# Patient Record
Sex: FEMALE | Race: WHITE | URBAN - METROPOLITAN AREA
[De-identification: names, ages, dates, MRNs, and addresses within clinical notes are randomized per-mention and may not be internally consistent; named-entity substitution may affect disease eponyms.]

---

## 2024-10-16 ENCOUNTER — TELEPHONE (OUTPATIENT)
Dept: SURGERY | Facility: CLINIC | Age: 54
End: 2024-10-16
Payer: COMMERCIAL

## 2024-11-11 ENCOUNTER — OFFICE VISIT (OUTPATIENT)
Dept: SURGERY | Facility: CLINIC | Age: 54
End: 2024-11-11
Payer: COMMERCIAL

## 2024-11-11 VITALS
HEART RATE: 73 BPM | WEIGHT: 156.63 LBS | SYSTOLIC BLOOD PRESSURE: 142 MMHG | OXYGEN SATURATION: 99 % | DIASTOLIC BLOOD PRESSURE: 89 MMHG

## 2024-11-11 DIAGNOSIS — C18.9 ADENOCARCINOMA OF COLON: Primary | ICD-10-CM

## 2024-11-11 PROCEDURE — 4010F ACE/ARB THERAPY RXD/TAKEN: CPT | Mod: CPTII,S$GLB,, | Performed by: STUDENT IN AN ORGANIZED HEALTH CARE EDUCATION/TRAINING PROGRAM

## 2024-11-11 PROCEDURE — 3077F SYST BP >= 140 MM HG: CPT | Mod: CPTII,S$GLB,, | Performed by: STUDENT IN AN ORGANIZED HEALTH CARE EDUCATION/TRAINING PROGRAM

## 2024-11-11 PROCEDURE — 1159F MED LIST DOCD IN RCRD: CPT | Mod: CPTII,S$GLB,, | Performed by: STUDENT IN AN ORGANIZED HEALTH CARE EDUCATION/TRAINING PROGRAM

## 2024-11-11 PROCEDURE — 1160F RVW MEDS BY RX/DR IN RCRD: CPT | Mod: CPTII,S$GLB,, | Performed by: STUDENT IN AN ORGANIZED HEALTH CARE EDUCATION/TRAINING PROGRAM

## 2024-11-11 PROCEDURE — 99205 OFFICE O/P NEW HI 60 MIN: CPT | Mod: S$GLB,,, | Performed by: STUDENT IN AN ORGANIZED HEALTH CARE EDUCATION/TRAINING PROGRAM

## 2024-11-11 PROCEDURE — G2211 COMPLEX E/M VISIT ADD ON: HCPCS | Mod: S$GLB,,, | Performed by: STUDENT IN AN ORGANIZED HEALTH CARE EDUCATION/TRAINING PROGRAM

## 2024-11-11 PROCEDURE — 3079F DIAST BP 80-89 MM HG: CPT | Mod: CPTII,S$GLB,, | Performed by: STUDENT IN AN ORGANIZED HEALTH CARE EDUCATION/TRAINING PROGRAM

## 2024-11-11 PROCEDURE — 99999 PR PBB SHADOW E&M-EST. PATIENT-LVL III: CPT | Mod: PBBFAC,,, | Performed by: STUDENT IN AN ORGANIZED HEALTH CARE EDUCATION/TRAINING PROGRAM

## 2024-11-11 RX ORDER — LISINOPRIL 10 MG/1
10 TABLET ORAL EVERY MORNING
COMMUNITY

## 2024-11-11 NOTE — PROGRESS NOTES
Surgical Oncology Clinic Note      Referring Provider: Dr. Gucci Vargas   PCP: No primary care provider on file.    Reason For Visit: No chief complaint on file.      Oncologic History:   - Sigmoid adenocarcinoma s/p robo sigmoidectomy     History of Present Illness:  Ms Leon is a 53yo female referred to the surgery clinic for oligometastatic colon cancer with 2 discrete liver mets. The patient was initially seen in November 2023 with new onset hematochezia. As part of her workup, she was found to have elevated LFTs for which a RUQ U/S was performed and demonstrated a large ~6.5cm anterior liver lesion concerning for possible met which was further characterized on CT and later biopsied with path demonstrating adenocarcinoma. She further underwent a colonoscopy demonstrating an apple core lesion in her sigmoid colon with path consistent with a adenocarcinoma. The patient was seen by colorectal surgery who took her for a robotic assisted sigmoid colectomy and port placement in January with chemotherapy initiation shortly after. She has been followed in the hemo/onc clinic for ongoing chemotherapy (now completed 20 cycles) and interval assessment of her metastatic lesions on serial CT scans. Her liver lesions are treatment responsive and have shown an interval decrease in size. She has now been referred to the surg onc clinic for discussion of hepatectomy.     The patient is largely asymptomatic at this time. She has fully healed from her sigmoidectomy and is tolerating a diet, having regular bowel movements and ambulating without difficulty. Since adjustment of her chemo regimen, she notes significant improvement in the side effects.      Pathology:    Staging: Cancer Staging   No matching staging information was found for the patient.   Staging form: Colon and Rectum, AJCC 8th Edition  - Pathologic stage from 1/10/2024: Stage MAXINE (pT3, pN1a, pM1a) - Signed by Skip Mcnally MD on 1/11/2024     Active  Treatment:  [No matching plan found]  5-FU/LV plux oxaliplatin plus Vectibix (currently completed 20 cycles)     Current Outpatient Medications:     lisinopriL 10 MG tablet, Take 10 mg by mouth every morning., Disp: , Rfl:     Review of patient's allergies indicates:  No Known Allergies    No past medical history on file.    No past surgical history on file.    No family history on file.    Social History     Socioeconomic History    Marital status: Unknown   Tobacco Use    Smoking status: Never     Passive exposure: Never    Smokeless tobacco: Never     Social Drivers of Health     Financial Resource Strain: High Risk (5/29/2023)    Received from [facility]    Overall Financial Resource Strain (CARDIA)     Difficulty of Paying Living Expenses: Hard   Food Insecurity: No Food Insecurity (1/9/2024)    Received from [facility]    Hunger Vital Sign     Worried About Running Out of Food in the Last Year: Never true     Ran Out of Food in the Last Year: Never true   Transportation Needs: No Transportation Needs (1/9/2024)    Received from [facility]    PRAPARE - Transportation     Lack of Transportation (Medical): No     Lack of Transportation (Non-Medical): No   Physical Activity: Insufficiently Active (5/29/2023)    Received from [facility]    Exercise Vital Sign     Days of Exercise per Week: 2 days     Minutes of Exercise per Session: 60 min   Stress: Stress Concern Present (5/29/2023)    Received from [facility]    [employer] - Occupational Stress Questionnaire     Feeling of Stress : Very much   Housing Stability: Low Risk  (1/9/2024)    Received from [facility]    Housing Stability Vital Sign     Unable to Pay for Housing in the Last Year: No     Number of Places Lived in the Last Year: 1     Unstable Housing in the Last Year: No       ROS    Vitals:    11/11/24 0922   BP: (!) 142/89    Pulse: 73     There is no height or weight on file to calculate BMI.  Physical Exam  Constitutional:       Appearance: She is obese.   HENT:      Head: Normocephalic and atraumatic.   Eyes:      Extraocular Movements: Extraocular movements intact.      Conjunctiva/sclera: Conjunctivae normal.      Pupils: Pupils are equal, round, and reactive to light.   Cardiovascular:      Rate and Rhythm: Normal rate.   Pulmonary:      Effort: Pulmonary effort is normal.      Breath sounds: Normal breath sounds.   Abdominal:      General: Abdomen is flat.      Palpations: Abdomen is soft.      Comments: No palpable masses. Well healed lap scars    Musculoskeletal:         General: Normal range of motion.      Cervical back: Normal range of motion.   Skin:     General: Skin is warm and dry.   Neurological:      General: No focal deficit present.      Mental Status: She is oriented to person, place, and time. Mental status is at baseline.   Psychiatric:         Mood and Affect: Mood normal.        DATA REVIEW:  I personally reviewed the following records for this visit: lab work from prior visit, notes from other physicians, surgical pathology results, endoscopy results, radiographic study evaluation, and laboratory results done by primary care physician    Lab Results   Component Value Date    WBC 5.1 11/05/2024    HGB 11.5 11/05/2024    HCT 34.6 11/05/2024     11/05/2024    CHOL 206 (H) 05/30/2023    TRIG 99 05/30/2023    HDL 74 (H) 05/30/2023    LDLCALC 113 (H) 05/30/2023    TSH 2.46 05/30/2023       Lab Results   Component Value Date    CEA 2.15 11/05/2024        Radiology:     EXAMINATION:   CT CHEST/THORAX WITH CONTRAST; CT ABD & PELVIS WITH CONTRAST,   11/4/2024     CLINICAL HISTORY:   55 y/o F,.    Cancer of sigmoid colon.   Colon cancer.  Subsequent   treatment planning.     TECHNIQUE:   5 mm axial imaging of the chest, abdomen and pelvis was performed with   sagittal and coronal reconstruction.     CONTRAST:    IV: 80mL Omnipaque 350 IV contrast.   Oral:  Oral contrast was administered..     COMPARISON:   CT of the chest, abdomen, and pelvis from August 6, 2024.     FINDINGS:   Chest: The lungs are clear.  There is no suspicious pulmonary nodule,   mass, consolidation, pleural effusion, or pneumothorax.  The central   airways and major segmental bronchi are widely patent.     The heart is normal in size.  No pericardial effusion.  Thoracic aorta   is nonaneurysmal.  There is an aberrant right subclavian artery   calcified right hilar node is unchanged and likely related to old   granulomatous disease.  There is no new adenopathy.  Right IJ port   terminates in the SVC.     Upper Abdomen: The hyperattenuating metastatic lesions in the liver   continue to decrease in size.  For reference, the largest lesion in the   lateral hepatic segment now measures 4.4 x 3 cm (previously 5.6 x 3.6   cm).  The lesion in the anterior hepatic segment now measures 2.8 x 2   cm (previously 3.4 x 2.3 cm).  There is no new liver lesion.  The   spleen, adrenal glands, gallbladder, pancreas, and biliary tree are   within normal limits.     : No hydronephrosis or suspicious mass.  Urinary bladder is distended   and otherwise unremarkable.  Uterus is present.  Calcified fibroid is   noted.     GI: The stomach is decompressed and otherwise normal.  Small bowel is   normal in caliber.  No evidence of small-bowel obstruction or   inflammation.  Oral contrast reaches the distal small bowel.  There are   no secondary findings of acute appendicitis in the right lower   quadrant.  Stable postoperative changes in the sigmoid colon.     Peritoneum, lymph nodes, vasculature: No free fluid, free air or bulky   adenopathy.  The abdominal aorta is nonaneurysmal.  Anterior abdominal   wall is unremarkable.     ASSESSMENT & PLAN:  No diagnosis found. Metastatic colon cancer   Oumou Leon is a 54 y.o. 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 She has completed 20 cycles of chemotherapy already and has been referred for discussion of hepatectomy. Pt liver function panel iss largely within normal limits but would recommend cessation of chemotherapy prior to ongoing discussion of hepatectomy. Additionally, on her initial MRI last year, she had what appeared to be 2 additional liver lesions that are no longer appreciated on CT. Will need a repeat MRI to evaluate. Size of lesions currently are amenable to resection but will need to rule out additional lesions first.   -- repeat MRI liver protocol   -- recommend stopping chemotherapy   -- will need to see pt back in surg onc clinic following repeat MRI       [place] MDR4

## 2024-11-12 ENCOUNTER — TELEPHONE (OUTPATIENT)
Dept: SURGERY | Facility: CLINIC | Age: 54
End: 2024-11-12
Payer: COMMERCIAL

## 2024-11-12 PROBLEM — C18.9 ADENOCARCINOMA OF COLON: Status: ACTIVE | Noted: 2024-11-12

## 2024-11-12 NOTE — TELEPHONE ENCOUNTER
Returned pt call. Pt states Dr Vargas had  order pt MRI and is vitaliy for 11/19/24.  Informed her our office will need to get [facility] to send us the images. Pt states she will ask Dr Vargas office to do that.

## 2024-11-12 NOTE — TELEPHONE ENCOUNTER
----- Message from Emma sent at 11/12/2024 10:08 AM CST -----  Regarding: PT ADVICE  Contact: [phone]  Pt iS requesting a  call from someone in the office and is asking for a return call back soon. Thanks.     Reason for call:DISCUSS PLAN of care      Patient's DX:    [phone]  Patient requesting call back or [facility] msg: PT@ [phone]

## 2024-11-14 ENCOUNTER — DOCUMENTATION ONLY (OUTPATIENT)
Dept: HEMATOLOGY/ONCOLOGY | Facility: CLINIC | Age: 54
End: 2024-11-14
Payer: COMMERCIAL

## 2024-11-14 NOTE — PROGRESS NOTES
[facility]     Colorectal Liver Metastasis Tumor Board Note      Date: 11/14/2024    Referring Provider: Dr. Rosado     Case Overview: Mrs. Leon (54F) was initially diagnosed in 12/2023 with sigmoid colon adenocarcinoma with synchronous liver metastases. Underwent colectomy in 1/2024 followed by 20 cycles of FOLFOX + vectibix.     Participants: medical oncology, surgical oncology, colorectal surgery, transplant surgeons, interventional radiology, and oncology navigator     Imaging Reviewed: CT 11/2024    Radiology Review: Interval decrease in metastatic disease to the liver, the largest lesion now measures 4.4 cm, previously  5.6 cm. No new suspicious liver lesions.     Orders/Referrals: none.     Board Recommendations: Proceed with resection as planned. If CESIA post-op, recommend proceeding with surveillance.

## 2024-11-25 ENCOUNTER — LAB VISIT (OUTPATIENT)
Dept: LAB | Facility: HOSPITAL | Age: 54
End: 2024-11-25
Attending: STUDENT IN AN ORGANIZED HEALTH CARE EDUCATION/TRAINING PROGRAM
Payer: COMMERCIAL

## 2024-11-25 ENCOUNTER — OFFICE VISIT (OUTPATIENT)
Dept: SURGERY | Facility: CLINIC | Age: 54
End: 2024-11-25
Payer: COMMERCIAL

## 2024-11-25 ENCOUNTER — DOCUMENTATION ONLY (OUTPATIENT)
Dept: SURGERY | Facility: CLINIC | Age: 54
End: 2024-11-25

## 2024-11-25 VITALS
WEIGHT: 159.5 LBS | HEART RATE: 61 BPM | OXYGEN SATURATION: 100 % | SYSTOLIC BLOOD PRESSURE: 133 MMHG | DIASTOLIC BLOOD PRESSURE: 80 MMHG

## 2024-11-25 DIAGNOSIS — C18.9 ADENOCARCINOMA OF COLON: Primary | ICD-10-CM

## 2024-11-25 DIAGNOSIS — C18.9 ADENOCARCINOMA OF COLON: ICD-10-CM

## 2024-11-25 LAB
ALBUMIN SERPL BCP-MCNC: 4.1 G/DL (ref 3.5–5.2)
ALP SERPL-CCNC: 175 U/L (ref 40–150)
ALT SERPL W/O P-5'-P-CCNC: 30 U/L (ref 10–44)
ANION GAP SERPL CALC-SCNC: 9 MMOL/L (ref 8–16)
AST SERPL-CCNC: 33 U/L (ref 10–40)
BASOPHILS # BLD AUTO: 0.06 K/UL (ref 0–0.2)
BASOPHILS NFR BLD: 1.4 % (ref 0–1.9)
BILIRUB SERPL-MCNC: 0.4 MG/DL (ref 0.1–1)
BUN SERPL-MCNC: 8 MG/DL (ref 6–20)
CALCIUM SERPL-MCNC: 9.8 MG/DL (ref 8.7–10.5)
CHLORIDE SERPL-SCNC: 106 MMOL/L (ref 95–110)
CO2 SERPL-SCNC: 27 MMOL/L (ref 23–29)
CREAT SERPL-MCNC: 0.7 MG/DL (ref 0.5–1.4)
DIFFERENTIAL METHOD BLD: ABNORMAL
EOSINOPHIL # BLD AUTO: 0.1 K/UL (ref 0–0.5)
EOSINOPHIL NFR BLD: 1.8 % (ref 0–8)
ERYTHROCYTE [DISTWIDTH] IN BLOOD BY AUTOMATED COUNT: 13.2 % (ref 11.5–14.5)
EST. GFR  (NO RACE VARIABLE): >60 ML/MIN/1.73 M^2
GLUCOSE SERPL-MCNC: 93 MG/DL (ref 70–110)
HCT VFR BLD AUTO: 39.9 % (ref 37–48.5)
HGB BLD-MCNC: 13.2 G/DL (ref 12–16)
IMM GRANULOCYTES # BLD AUTO: 0.01 K/UL (ref 0–0.04)
IMM GRANULOCYTES NFR BLD AUTO: 0.2 % (ref 0–0.5)
INR PPP: 1 (ref 0.8–1.2)
LYMPHOCYTES # BLD AUTO: 1.4 K/UL (ref 1–4.8)
LYMPHOCYTES NFR BLD: 31.2 % (ref 18–48)
MCH RBC QN AUTO: 30.3 PG (ref 27–31)
MCHC RBC AUTO-ENTMCNC: 33.1 G/DL (ref 32–36)
MCV RBC AUTO: 92 FL (ref 82–98)
MONOCYTES # BLD AUTO: 0.7 K/UL (ref 0.3–1)
MONOCYTES NFR BLD: 15.1 % (ref 4–15)
NEUTROPHILS # BLD AUTO: 2.2 K/UL (ref 1.8–7.7)
NEUTROPHILS NFR BLD: 50.3 % (ref 38–73)
NRBC BLD-RTO: 0 /100 WBC
PLATELET # BLD AUTO: 238 K/UL (ref 150–450)
PMV BLD AUTO: 10.7 FL (ref 9.2–12.9)
POTASSIUM SERPL-SCNC: 4 MMOL/L (ref 3.5–5.1)
PROT SERPL-MCNC: 7.7 G/DL (ref 6–8.4)
PROTHROMBIN TIME: 11.4 SEC (ref 9–12.5)
RBC # BLD AUTO: 4.35 M/UL (ref 4–5.4)
SODIUM SERPL-SCNC: 142 MMOL/L (ref 136–145)
WBC # BLD AUTO: 4.36 K/UL (ref 3.9–12.7)

## 2024-11-25 PROCEDURE — 4010F ACE/ARB THERAPY RXD/TAKEN: CPT | Mod: CPTII,S$GLB,, | Performed by: STUDENT IN AN ORGANIZED HEALTH CARE EDUCATION/TRAINING PROGRAM

## 2024-11-25 PROCEDURE — 85025 COMPLETE CBC W/AUTO DIFF WBC: CPT | Performed by: STUDENT IN AN ORGANIZED HEALTH CARE EDUCATION/TRAINING PROGRAM

## 2024-11-25 PROCEDURE — 99215 OFFICE O/P EST HI 40 MIN: CPT | Mod: S$GLB,,, | Performed by: STUDENT IN AN ORGANIZED HEALTH CARE EDUCATION/TRAINING PROGRAM

## 2024-11-25 PROCEDURE — 99999 PR PBB SHADOW E&M-EST. PATIENT-LVL IV: CPT | Mod: PBBFAC,,, | Performed by: STUDENT IN AN ORGANIZED HEALTH CARE EDUCATION/TRAINING PROGRAM

## 2024-11-25 PROCEDURE — 3075F SYST BP GE 130 - 139MM HG: CPT | Mod: CPTII,S$GLB,, | Performed by: STUDENT IN AN ORGANIZED HEALTH CARE EDUCATION/TRAINING PROGRAM

## 2024-11-25 PROCEDURE — 36415 COLL VENOUS BLD VENIPUNCTURE: CPT | Performed by: STUDENT IN AN ORGANIZED HEALTH CARE EDUCATION/TRAINING PROGRAM

## 2024-11-25 PROCEDURE — 85610 PROTHROMBIN TIME: CPT | Performed by: STUDENT IN AN ORGANIZED HEALTH CARE EDUCATION/TRAINING PROGRAM

## 2024-11-25 PROCEDURE — 1160F RVW MEDS BY RX/DR IN RCRD: CPT | Mod: CPTII,S$GLB,, | Performed by: STUDENT IN AN ORGANIZED HEALTH CARE EDUCATION/TRAINING PROGRAM

## 2024-11-25 PROCEDURE — 3079F DIAST BP 80-89 MM HG: CPT | Mod: CPTII,S$GLB,, | Performed by: STUDENT IN AN ORGANIZED HEALTH CARE EDUCATION/TRAINING PROGRAM

## 2024-11-25 PROCEDURE — 80053 COMPREHEN METABOLIC PANEL: CPT | Performed by: STUDENT IN AN ORGANIZED HEALTH CARE EDUCATION/TRAINING PROGRAM

## 2024-11-25 PROCEDURE — 1159F MED LIST DOCD IN RCRD: CPT | Mod: CPTII,S$GLB,, | Performed by: STUDENT IN AN ORGANIZED HEALTH CARE EDUCATION/TRAINING PROGRAM

## 2024-11-25 PROCEDURE — G2211 COMPLEX E/M VISIT ADD ON: HCPCS | Mod: S$GLB,,, | Performed by: STUDENT IN AN ORGANIZED HEALTH CARE EDUCATION/TRAINING PROGRAM

## 2024-11-25 NOTE — H&P (VIEW-ONLY)
Surgical Oncology Clinic Note    Referring Provider: No ref. provider found   PCP: No primary care provider on file.    Reason For Visit: Metastatic sigmoid adenocarcinoma    Oncologic History:   - Sigmoid adenocarcinoma s/p robo sigmoidectomy     History of Present Illness:  Ms Leon is a 55yo female referred to the surgery clinic for oligometastatic colon cancer with 2 discrete liver mets. The patient was initially seen in November 2023 with new onset hematochezia. As part of her workup, she was found to have elevated LFTs for which a RUQ U/S was performed and demonstrated a large ~6.5cm anterior liver lesion concerning for possible met which was further characterized on CT and later biopsied with path demonstrating adenocarcinoma. She further underwent a colonoscopy demonstrating an apple core lesion in her sigmoid colon with path consistent with a adenocarcinoma. The patient was seen by colorectal surgery who took her for a robotic assisted sigmoid colectomy and port placement in January with chemotherapy initiation shortly after. She has been followed in the hemo/onc clinic for ongoing chemotherapy (now completed 20 cycles) and interval assessment of her metastatic lesions on serial CT scans. Her liver lesions are treatment responsive and have shown an interval decrease in size. She has now been referred to the surg onc clinic for discussion of hepatectomy.     Interval History 11/25:  Ms Leon is doing well. Had MRI procedure, which was reviewed in detail. She has good appetite and is tolerating a diet with regular bowel movements. Works as a  and is continuously moving/lifting supplies daily. Has no shortness of breath or chest pain. No recent weight loss. Her last chemotherapy was November 5, which she tolerated appropriately.     Pathology:    Staging:  Cancer Staging   Adenocarcinoma of colon  Staging form: Colon and Rectum, AJCC 8th Edition  - Clinical stage from 11/11/2024: Stage MAXINE  (cT3, cN1a, cM1a) - Signed by Ponce Rosado Jr., MD on 11/12/2024   Staging form: Colon and Rectum, AJCC 8th Edition  - Pathologic stage from 1/10/2024: Stage MAXINE (pT3, pN1a, pM1a) - Signed by Skip Mcnally MD on 1/11/2024     Active Treatment:  [No matching plan found]  5-FU/LV plux oxaliplatin plus Vectibix (currently completed 20 cycles)     Current Outpatient Medications:     lisinopriL 10 MG tablet, Take 10 mg by mouth every morning., Disp: , Rfl:     Review of patient's allergies indicates:  No Known Allergies    History reviewed. No pertinent past medical history.    History reviewed. No pertinent surgical history.    No family history on file.    Social History     Socioeconomic History    Marital status: Single   Tobacco Use    Smoking status: Never     Passive exposure: Never    Smokeless tobacco: Never     Social Drivers of Health     Financial Resource Strain: High Risk (5/29/2023)    Received from [facility]    Overall Financial Resource Strain (CARDIA)     Difficulty of Paying Living Expenses: Hard   Food Insecurity: No Food Insecurity (1/9/2024)    Received from [facility]    Hunger Vital Sign     Worried About Running Out of Food in the Last Year: Never true     Ran Out of Food in the Last Year: Never true   Transportation Needs: No Transportation Needs (1/9/2024)    Received from [facility]    PRAPARE - Transportation     Lack of Transportation (Medical): No     Lack of Transportation (Non-Medical): No   Physical Activity: Insufficiently Active (5/29/2023)    Received from [facility]    Exercise Vital Sign     Days of Exercise per Week: 2 days     Minutes of Exercise per Session: 60 min   Stress: Stress Concern Present (5/29/2023)    Received from [facility]    [employer] - Occupational Stress Questionnaire     Feeling of Stress : Very much   Housing Stability: Low Risk   (1/9/2024)    Received from [facility]    Housing Stability Vital Sign     Unable to Pay for Housing in the Last Year: No     Number of Places Lived in the Last Year: 1     Unstable Housing in the Last Year: No       Review of Systems   Constitutional:  Negative for weight loss.   Respiratory:  Negative for shortness of breath.    Cardiovascular:  Negative for chest pain and leg swelling.   Gastrointestinal:  Negative for constipation and diarrhea.     Vitals:    11/25/24 0820   BP: 133/80   Pulse: 61       There is no height or weight on file to calculate BMI.  Physical Exam  Constitutional:       Appearance: She is obese.   HENT:      Head: Normocephalic and atraumatic.   Eyes:      Extraocular Movements: Extraocular movements intact.      Conjunctiva/sclera: Conjunctivae normal.      Pupils: Pupils are equal, round, and reactive to light.   Cardiovascular:      Rate and Rhythm: Normal rate and regular rhythm.      Pulses:           Radial pulses are 2+ on the right side and 2+ on the left side.      Heart sounds: Normal heart sounds.   Pulmonary:      Effort: Pulmonary effort is normal.      Breath sounds: Normal breath sounds. No wheezing, rhonchi or rales.   Abdominal:      General: Abdomen is flat.      Palpations: Abdomen is soft.      Comments: No palpable masses. Well healed lap scars    Musculoskeletal:         General: Normal range of motion.      Cervical back: Normal range of motion.   Skin:     General: Skin is warm and dry.   Neurological:      General: No focal deficit present.      Mental Status: She is oriented to person, place, and time. Mental status is at baseline.   Psychiatric:         Mood and Affect: Mood normal.          DATA REVIEW:  I personally reviewed the following records for this visit: lab work from prior visit, notes from other physicians, surgical pathology results, endoscopy results, radiographic study evaluation, and laboratory results done by primary care  physician    Lab Results   Component Value Date    WBC 4.36 11/25/2024    HGB 13.2 11/25/2024    HCT 39.9 11/25/2024     11/25/2024    CHOL 206 (H) 05/30/2023    TRIG 99 05/30/2023    HDL 74 (H) 05/30/2023    LDLCALC 113 (H) 05/30/2023    ALT 30 11/25/2024    AST 33 11/25/2024     11/25/2024    K 4.0 11/25/2024     11/25/2024    CREATININE 0.7 11/25/2024    BUN 8 11/25/2024    CO2 27 11/25/2024    TSH 2.46 05/30/2023    INR 1.0 11/25/2024       Lab Results   Component Value Date    CEA 2.15 11/05/2024        Radiology:   MRI Abd   11/19/24    FINDINGS:   No significant fat or iron deposition in the liver.  Unchanged hepatic   segment 3/4B metastasis measuring 4.7 x 3.1 cm (series 1000, image 91,   previously 4.4 x 3.0 cm).  Unchanged hepatic segment 4B/5 metastasis   measuring 2.9 x 2.0 cm (series 1000, image 73, previously 2.8 x 2.0   cm).  The above described hepatic metastases demonstrate the following   signal characteristics: T1 hypointense/T2 hypointense to background   liver with mild heterogeneous delayed enhancement, and minimal   restricted diffusion.  No new hepatic metastasis.     No suspicious lesion in the spleen, pancreas, adrenal glands, or   kidneys.  Gallbladder sludge.  No biliary ductal dilatation.  The   visualized loops of bowel are normal in caliber.  No pathologically   enlarged mesenteric or retroperitoneal lymph nodes.     EXAMINATION:   CT CHEST/THORAX WITH CONTRAST; CT ABD & PELVIS WITH CONTRAST,   11/4/2024     FINDINGS:   Chest: The lungs are clear.  There is no suspicious pulmonary nodule,   mass, consolidation, pleural effusion, or pneumothorax.  The central   airways and major segmental bronchi are widely patent.     The heart is normal in size.  No pericardial effusion.  Thoracic aorta   is nonaneurysmal.  There is an aberrant right subclavian artery   calcified right hilar node is unchanged and likely related to old   granulomatous disease.  There is no new  adenopathy.  Right IJ port   terminates in the SVC.     Upper Abdomen: The hyperattenuating metastatic lesions in the liver   continue to decrease in size.  For reference, the largest lesion in the   lateral hepatic segment now measures 4.4 x 3 cm (previously 5.6 x 3.6   cm).  The lesion in the anterior hepatic segment now measures 2.8 x 2   cm (previously 3.4 x 2.3 cm).  There is no new liver lesion.  The   spleen, adrenal glands, gallbladder, pancreas, and biliary tree are   within normal limits.     : No hydronephrosis or suspicious mass.  Urinary bladder is distended   and otherwise unremarkable.  Uterus is present.  Calcified fibroid is   noted.     GI: The stomach is decompressed and otherwise normal.  Small bowel is   normal in caliber.  No evidence of small-bowel obstruction or   inflammation.  Oral contrast reaches the distal small bowel.  There are   no secondary findings of acute appendicitis in the right lower   quadrant.  Stable postoperative changes in the sigmoid colon.     Peritoneum, lymph nodes, vasculature: No free fluid, free air or bulky   adenopathy.  The abdominal aorta is nonaneurysmal.  Anterior abdominal   wall is unremarkable.     ASSESSMENT & PLAN:  1. Adenocarcinoma of colon     Metastatic colon cancer   Oumou Leon is a 54 y.o. female with oligometastatic sigmoid adenocarcinoma s/p robotic assisted sigmoidectomy with 2 discrete liver mets. The patient returns today for discussion of hepatectomy. The patient has completed 20 cycles of FOLFOX + vectibix. Repeat MRI reveals stable 2 liver lesions.     Dr Rosado discussed with the patient the locations of the liver lesions and the approach to surgical management. He discussed completing a partial hepatectomy of the two lesions with possible cholecystectomy. Risks of the procedure were detailed, including bleeding, infection, bile leak, and damage to surrounding structures. The patient was informed about alternatives to surgical  treatment and was notified that tissue obtained during the surgery will be sent for pathologic evaluation. Dr Rosado detailed post-operative course expectations, including a hospital stay for 5-7 days, drain placement, and expectations for discharge home / returning to work. The patient understood and all questions were answered to her satisfaction. The patient was scheduled for surgery on 12/11/24. Blood and surgical consents were obtained.     Alondra Fontenot DO  PGY-1

## 2024-11-25 NOTE — PROGRESS NOTES
Surgical Oncology Clinic Note    Referring Provider: No ref. provider found   PCP: No primary care provider on file.    Reason For Visit: Metastatic sigmoid adenocarcinoma    Oncologic History:   - Sigmoid adenocarcinoma s/p robo sigmoidectomy     History of Present Illness:  Ms Leon is a 55yo female referred to the surgery clinic for oligometastatic colon cancer with 2 discrete liver mets. The patient was initially seen in November 2023 with new onset hematochezia. As part of her workup, she was found to have elevated LFTs for which a RUQ U/S was performed and demonstrated a large ~6.5cm anterior liver lesion concerning for possible met which was further characterized on CT and later biopsied with path demonstrating adenocarcinoma. She further underwent a colonoscopy demonstrating an apple core lesion in her sigmoid colon with path consistent with a adenocarcinoma. The patient was seen by colorectal surgery who took her for a robotic assisted sigmoid colectomy and port placement in January with chemotherapy initiation shortly after. She has been followed in the hemo/onc clinic for ongoing chemotherapy (now completed 20 cycles) and interval assessment of her metastatic lesions on serial CT scans. Her liver lesions are treatment responsive and have shown an interval decrease in size. She has now been referred to the surg onc clinic for discussion of hepatectomy.     Interval History 11/25:  Ms Leon is doing well. Had MRI procedure, which was reviewed in detail. She has good appetite and is tolerating a diet with regular bowel movements. Works as a  and is continuously moving/lifting supplies daily. Has no shortness of breath or chest pain. No recent weight loss. Her last chemotherapy was November 5, which she tolerated appropriately.     Pathology:    Staging:  Cancer Staging   Adenocarcinoma of colon  Staging form: Colon and Rectum, AJCC 8th Edition  - Clinical stage from 11/11/2024: Stage MAXINE  (cT3, cN1a, cM1a) - Signed by Ponce Rosado Jr., MD on 11/12/2024   Staging form: Colon and Rectum, AJCC 8th Edition  - Pathologic stage from 1/10/2024: Stage MAXINE (pT3, pN1a, pM1a) - Signed by Skip Mcnally MD on 1/11/2024     Active Treatment:  [No matching plan found]  5-FU/LV plux oxaliplatin plus Vectibix (currently completed 20 cycles)     Current Outpatient Medications:     lisinopriL 10 MG tablet, Take 10 mg by mouth every morning., Disp: , Rfl:     Review of patient's allergies indicates:  No Known Allergies    History reviewed. No pertinent past medical history.    History reviewed. No pertinent surgical history.    No family history on file.    Social History     Socioeconomic History    Marital status: Single   Tobacco Use    Smoking status: Never     Passive exposure: Never    Smokeless tobacco: Never     Social Drivers of Health     Financial Resource Strain: High Risk (5/29/2023)    Received from [facility]    Overall Financial Resource Strain (CARDIA)     Difficulty of Paying Living Expenses: Hard   Food Insecurity: No Food Insecurity (1/9/2024)    Received from [facility]    Hunger Vital Sign     Worried About Running Out of Food in the Last Year: Never true     Ran Out of Food in the Last Year: Never true   Transportation Needs: No Transportation Needs (1/9/2024)    Received from [facility]    PRAPARE - Transportation     Lack of Transportation (Medical): No     Lack of Transportation (Non-Medical): No   Physical Activity: Insufficiently Active (5/29/2023)    Received from [facility]    Exercise Vital Sign     Days of Exercise per Week: 2 days     Minutes of Exercise per Session: 60 min   Stress: Stress Concern Present (5/29/2023)    Received from [facility]    [place] of Occupational Health - Occupational Stress Questionnaire     Feeling of Stress : Very much   Housing Stability: Low Risk   (1/9/2024)    Received from [facility]    Housing Stability Vital Sign     Unable to Pay for Housing in the Last Year: No     Number of Places Lived in the Last Year: 1     Unstable Housing in the Last Year: No       Review of Systems   Constitutional:  Negative for weight loss.   Respiratory:  Negative for shortness of breath.    Cardiovascular:  Negative for chest pain and leg swelling.   Gastrointestinal:  Negative for constipation and diarrhea.     Vitals:    11/25/24 0820   BP: 133/80   Pulse: 61       There is no height or weight on file to calculate BMI.  Physical Exam  Constitutional:       Appearance: She is obese.   HENT:      Head: Normocephalic and atraumatic.   Eyes:      Extraocular Movements: Extraocular movements intact.      Conjunctiva/sclera: Conjunctivae normal.      Pupils: Pupils are equal, round, and reactive to light.   Cardiovascular:      Rate and Rhythm: Normal rate and regular rhythm.      Pulses:           Radial pulses are 2+ on the right side and 2+ on the left side.      Heart sounds: Normal heart sounds.   Pulmonary:      Effort: Pulmonary effort is normal.      Breath sounds: Normal breath sounds. No wheezing, rhonchi or rales.   Abdominal:      General: Abdomen is flat.      Palpations: Abdomen is soft.      Comments: No palpable masses. Well healed lap scars    Musculoskeletal:         General: Normal range of motion.      Cervical back: Normal range of motion.   Skin:     General: Skin is warm and dry.   Neurological:      General: No focal deficit present.      Mental Status: She is oriented to person, place, and time. Mental status is at baseline.   Psychiatric:         Mood and Affect: Mood normal.          DATA REVIEW:  I personally reviewed the following records for this visit: lab work from prior visit, notes from other physicians, surgical pathology results, endoscopy results, radiographic study evaluation, and laboratory results done by primary care  physician    Lab Results   Component Value Date    WBC 4.36 11/25/2024    HGB 13.2 11/25/2024    HCT 39.9 11/25/2024     11/25/2024    CHOL 206 (H) 05/30/2023    TRIG 99 05/30/2023    HDL 74 (H) 05/30/2023    LDLCALC 113 (H) 05/30/2023    ALT 30 11/25/2024    AST 33 11/25/2024     11/25/2024    K 4.0 11/25/2024     11/25/2024    CREATININE 0.7 11/25/2024    BUN 8 11/25/2024    CO2 27 11/25/2024    TSH 2.46 05/30/2023    INR 1.0 11/25/2024       Lab Results   Component Value Date    CEA 2.15 11/05/2024        Radiology:   MRI Abd   11/19/24    FINDINGS:   No significant fat or iron deposition in the liver.  Unchanged hepatic   segment 3/4B metastasis measuring 4.7 x 3.1 cm (series 1000, image 91,   previously 4.4 x 3.0 cm).  Unchanged hepatic segment 4B/5 metastasis   measuring 2.9 x 2.0 cm (series 1000, image 73, previously 2.8 x 2.0   cm).  The above described hepatic metastases demonstrate the following   signal characteristics: T1 hypointense/T2 hypointense to background   liver with mild heterogeneous delayed enhancement, and minimal   restricted diffusion.  No new hepatic metastasis.     No suspicious lesion in the spleen, pancreas, adrenal glands, or   kidneys.  Gallbladder sludge.  No biliary ductal dilatation.  The   visualized loops of bowel are normal in caliber.  No pathologically   enlarged mesenteric or retroperitoneal lymph nodes.     EXAMINATION:   CT CHEST/THORAX WITH CONTRAST; CT ABD & PELVIS WITH CONTRAST,   11/4/2024     FINDINGS:   Chest: The lungs are clear.  There is no suspicious pulmonary nodule,   mass, consolidation, pleural effusion, or pneumothorax.  The central   airways and major segmental bronchi are widely patent.     The heart is normal in size.  No pericardial effusion.  Thoracic aorta   is nonaneurysmal.  There is an aberrant right subclavian artery   calcified right hilar node is unchanged and likely related to old   granulomatous disease.  There is no new  adenopathy.  Right IJ port   terminates in the SVC.     Upper Abdomen: The hyperattenuating metastatic lesions in the liver   continue to decrease in size.  For reference, the largest lesion in the   lateral hepatic segment now measures 4.4 x 3 cm (previously 5.6 x 3.6   cm).  The lesion in the anterior hepatic segment now measures 2.8 x 2   cm (previously 3.4 x 2.3 cm).  There is no new liver lesion.  The   spleen, adrenal glands, gallbladder, pancreas, and biliary tree are   within normal limits.     : No hydronephrosis or suspicious mass.  Urinary bladder is distended   and otherwise unremarkable.  Uterus is present.  Calcified fibroid is   noted.     GI: The stomach is decompressed and otherwise normal.  Small bowel is   normal in caliber.  No evidence of small-bowel obstruction or   inflammation.  Oral contrast reaches the distal small bowel.  There are   no secondary findings of acute appendicitis in the right lower   quadrant.  Stable postoperative changes in the sigmoid colon.     Peritoneum, lymph nodes, vasculature: No free fluid, free air or bulky   adenopathy.  The abdominal aorta is nonaneurysmal.  Anterior abdominal   wall is unremarkable.     ASSESSMENT & PLAN:  1. Adenocarcinoma of colon     Metastatic colon cancer   Oumou Leon is a 54 y.o. female with oligometastatic sigmoid adenocarcinoma s/p robotic assisted sigmoidectomy with 2 discrete liver mets. The patient returns today for discussion of hepatectomy. The patient has completed 20 cycles of FOLFOX + vectibix. Repeat MRI reveals stable 2 liver lesions.     Dr Rosado discussed with the patient the locations of the liver lesions and the approach to surgical management. He discussed completing a partial hepatectomy of the two lesions with possible cholecystectomy. Risks of the procedure were detailed, including bleeding, infection, bile leak, and damage to surrounding structures. The patient was informed about alternatives to surgical  treatment and was notified that tissue obtained during the surgery will be sent for pathologic evaluation. Dr Rosado detailed post-operative course expectations, including a hospital stay for 5-7 days, drain placement, and expectations for discharge home / returning to work. The patient understood and all questions were answered to her satisfaction. The patient was scheduled for surgery on 12/11/24. Blood and surgical consents were obtained.     Alondra Fontenot DO  PGY-1

## 2024-11-25 NOTE — PROGRESS NOTES
Pt signed sx consent   Informed pt her sx is vitaliy for 12/11/24 and a RN will give her a call 1-2 days before sx date for sx time. Pre op instruction and facility address provided. Pt verbalized understanding. Pt questions and concerns were addressed. Informed pt if she has further questions and concerns to call our office. Telephone number provided.

## 2024-12-05 ENCOUNTER — ANESTHESIA EVENT (OUTPATIENT)
Dept: SURGERY | Facility: HOSPITAL | Age: 54
End: 2024-12-05
Payer: COMMERCIAL

## 2024-12-10 ENCOUNTER — TELEPHONE (OUTPATIENT)
Dept: SURGERY | Facility: CLINIC | Age: 54
End: 2024-12-10
Payer: COMMERCIAL

## 2024-12-10 DIAGNOSIS — C18.9 ADENOCARCINOMA OF COLON: Primary | ICD-10-CM

## 2024-12-10 DIAGNOSIS — C78.7 ADENOCARCINOMA OF COLON METASTATIC TO LIVER: ICD-10-CM

## 2024-12-10 DIAGNOSIS — C18.9 ADENOCARCINOMA OF COLON METASTATIC TO LIVER: ICD-10-CM

## 2024-12-10 RX ORDER — CELECOXIB 200 MG/1
400 CAPSULE ORAL
Status: CANCELLED | OUTPATIENT
Start: 2024-12-10

## 2024-12-10 RX ORDER — METRONIDAZOLE 500 MG/100ML
500 INJECTION, SOLUTION INTRAVENOUS
Status: CANCELLED | OUTPATIENT
Start: 2024-12-10

## 2024-12-10 RX ORDER — HEPARIN SODIUM 5000 [USP'U]/ML
5000 INJECTION, SOLUTION INTRAVENOUS; SUBCUTANEOUS
Status: CANCELLED | OUTPATIENT
Start: 2024-12-10

## 2024-12-10 RX ORDER — ACETAMINOPHEN 500 MG
1000 TABLET ORAL
Status: CANCELLED | OUTPATIENT
Start: 2024-12-10

## 2024-12-10 NOTE — TELEPHONE ENCOUNTER
Placed call to pt. Informed pt her sx is confirmed for Thursday 12/12/24 sx time 7 am and pt has to check in @ 5 am at the hospital. Reinforced pre op instruction and facility address.  Pt verbalized understanding.   Pt questions and concerns were addressed. Informed pt if she has further questions and concerns to call our office. Tel number provided.

## 2024-12-11 ENCOUNTER — ANESTHESIA (OUTPATIENT)
Dept: SURGERY | Facility: HOSPITAL | Age: 54
End: 2024-12-11
Payer: COMMERCIAL

## 2024-12-11 NOTE — ANESTHESIA PREPROCEDURE EVALUATION
[facility]  Anesthesia Pre-Operative Evaluation         Patient Name: Oumou Leon  YOB: 1970  MRN: [id number]    SUBJECTIVE:     Pre-operative evaluation for Procedure(s) (LRB):  PARTIAL LIVER RESECTION (N/A)     12/11/2024    Oumou Leon is a 54 y.o. female w/ a significant PMHx of migraines and colon cancer with metastatic liver disease diagnosed 11/2023. She is s/p sigmoidectomy with port placement 1/9/2024 with subsequent systemic therapy.     Patient now presents for the above procedure(s).      Prev airway: None documented.    Patient Active Problem List   Diagnosis    Adenocarcinoma of colon       Review of patient's allergies indicates:  No Known Allergies    Current Inpatient Medications:      No current facility-administered medications on file prior to encounter.     Current Outpatient Medications on File Prior to Encounter   Medication Sig Dispense Refill    lisinopriL 10 MG tablet Take 10 mg by mouth every morning.         No past surgical history on file.    Social History     Socioeconomic History    Marital status: Single   Tobacco Use    Smoking status: Never     Passive exposure: Never    Smokeless tobacco: Never     Social Drivers of Health     Financial Resource Strain: High Risk (5/29/2023)    Received from [facility]    Overall Financial Resource Strain (CARDIA)     Difficulty of Paying Living Expenses: Hard   Food Insecurity: No Food Insecurity (1/9/2024)    Received from [facility] 24/[facility] Card [facility]    Hunger Vital Sign     Worried About Running Out of Food in the Last Year: Never true     Ran Out of Food in the Last Year: Never true   Transportation Needs: No Transportation Needs (1/9/2024)    Received from [facility] 24/7 Card [facility]    PRAPARE - Transportation     Lack of Transportation (Medical): No     Lack of Transportation (Non-Medical): No   Physical Activity: Insufficiently Active (5/29/2023)    Received from [facility]    Exercise Vital Sign     Days of Exercise per Week: 2 days     Minutes of Exercise per Session: 60 min   Stress: Stress Concern Present (5/29/2023)    Received from [facility]    [place] citizen [place] of Occupational Health - Occupational Stress Questionnaire     Feeling of Stress : Very much   Housing Stability: Low Risk  (1/9/2024)    [email or web address]    Housing Stability Vital Sign     Unable to Pay for Housing in the Last Year: No     Number of Places Lived in the Last Year: 1     Unstable Housing in the Last Year: No       OBJECTIVE:     Vital Signs Range (Last 24H):         Significant Labs:  Lab Results   Component Value Date    WBC 4.36 11/25/2024    HGB 13.2 11/25/2024    HCT 39.9 11/25/2024     11/25/2024    CHOL 206 (H) 05/30/2023    TRIG 99 05/30/2023    HDL 74 (H) 05/30/2023    ALT 30 11/25/2024    AST 33 11/25/2024     11/25/2024    K 4.0 11/25/2024     11/25/2024    CREATININE 0.7 11/25/2024    BUN 8 11/25/2024    CO2 27 11/25/2024    TSH 2.46 05/30/2023    INR 1.0 11/25/2024       Diagnostic Studies: No relevant studies.    EKG:   No results found for this or any previous visit.    2D ECHO:  TTE:  No results found for this or any previous visit.    ARIN:  No results found for this or any previous visit.    ASSESSMENT/PLAN:           Pre-op Assessment    I have reviewed the Patient Summary Reports.     I have reviewed the Nursing Notes.    I have reviewed the Medications.     Review of Systems  Anesthesia Hx:  No problems with previous Anesthesia   History of prior surgery of interest to airway management or planning:          Denies Family Hx of Anesthesia complications.    Denies Personal Hx of Anesthesia complications.                    Social:  Non-Smoker, No Alcohol Use       Hematology/Oncology:       -- Denies Anemia:                  Current/Recent Cancer.                Cardiovascular:      Denies Hypertension.    Denies CAD.      Denies Dysrhythmias.    Denies CHF.    no hyperlipidemia                               Pulmonary:    Denies COPD.  Denies Asthma.     Denies Sleep Apnea.                Renal/:   Denies Chronic Renal Disease.                Hepatic/GI:      Denies GERD. Liver Disease,               Musculoskeletal:  Denies Arthritis.               Neurological:    Denies CVA. Denies Neuromuscular Disease.  Headaches Denies Seizures.          Denies Chronic Pain Syndrome                         Endocrine:  Denies Diabetes.   Denies Hyperthyroidism.       Denies Obesity / BMI > 30  Psych:   denies anxiety denies depression                   Anesthesia Plan  Type of Anesthesia, risks & benefits discussed:    Anesthesia Type: Gen ETT  Intra-op Monitoring Plan: Standard ASA Monitors and Art Line  Post Op Pain Control Plan: multimodal analgesia and IV/PO Opioids PRN  Induction:  IV  Airway Plan: Direct and Video, Post-Induction  Informed Consent: Informed consent signed with the Patient and all parties understand the risks and agree with anesthesia plan.  All questions answered.   ASA Score: 2  Day of Surgery Review of History & Physical: H&P Update referred to the surgeon/provider.    Ready For Surgery From Anesthesia Perspective.     .

## 2024-12-11 NOTE — PRE-PROCEDURE INSTRUCTIONS
PreOp Instructions given:   - Verbal medication information (what to hold and what to take)   - NPO guidelines 2300 - clears up to 15 mins prior to arrival time  - Arrival place directions given; time to be given the day before procedure by the   Surgeon's Office 0500 DOSC  - Bathing with antibacterial soap   - Don't wear any jewelry or bring any valuables AM of surgery   - No makeup or moisturizer to face   - No perfume/cologne, powder, lotions or aftershave   Pt. verbalized understanding.   Pt denies any h/o Anesthesia/Sedation complications or side effects.

## 2024-12-12 ENCOUNTER — HOSPITAL ENCOUNTER (INPATIENT)
Facility: HOSPITAL | Age: 54
LOS: 4 days | Discharge: HOME | DRG: 407 | End: 2024-12-16
Attending: STUDENT IN AN ORGANIZED HEALTH CARE EDUCATION/TRAINING PROGRAM | Admitting: STUDENT IN AN ORGANIZED HEALTH CARE EDUCATION/TRAINING PROGRAM
Payer: COMMERCIAL

## 2024-12-12 DIAGNOSIS — C18.9 ADENOCARCINOMA OF COLON: ICD-10-CM

## 2024-12-12 DIAGNOSIS — C78.7 ADENOCARCINOMA OF COLON METASTATIC TO LIVER: Primary | ICD-10-CM

## 2024-12-12 DIAGNOSIS — C18.9 ADENOCARCINOMA OF COLON METASTATIC TO LIVER: Primary | ICD-10-CM

## 2024-12-12 LAB
ABO + RH BLD: NORMAL
ALBUMIN SERPL BCP-MCNC: 3.5 G/DL (ref 3.5–5.2)
ALP SERPL-CCNC: 160 U/L (ref 40–150)
ALT SERPL W/O P-5'-P-CCNC: 187 U/L (ref 10–44)
ANION GAP SERPL CALC-SCNC: 12 MMOL/L (ref 8–16)
AST SERPL-CCNC: 291 U/L (ref 10–40)
BASOPHILS # BLD AUTO: 0.02 K/UL (ref 0–0.2)
BASOPHILS NFR BLD: 0.2 % (ref 0–1.9)
BILIRUB SERPL-MCNC: 0.5 MG/DL (ref 0.1–1)
BLD GP AB SCN CELLS X3 SERPL QL: NORMAL
BUN SERPL-MCNC: 12 MG/DL (ref 6–20)
CALCIUM SERPL-MCNC: 8.5 MG/DL (ref 8.7–10.5)
CHLORIDE SERPL-SCNC: 108 MMOL/L (ref 95–110)
CO2 SERPL-SCNC: 20 MMOL/L (ref 23–29)
CREAT SERPL-MCNC: 0.7 MG/DL (ref 0.5–1.4)
DIFFERENTIAL METHOD BLD: ABNORMAL
EOSINOPHIL # BLD AUTO: 0 K/UL (ref 0–0.5)
EOSINOPHIL NFR BLD: 0 % (ref 0–8)
ERYTHROCYTE [DISTWIDTH] IN BLOOD BY AUTOMATED COUNT: 12.7 % (ref 11.5–14.5)
EST. GFR  (NO RACE VARIABLE): >60 ML/MIN/1.73 M^2
GLUCOSE SERPL-MCNC: 144 MG/DL (ref 70–110)
HCT VFR BLD AUTO: 40.2 % (ref 37–48.5)
HGB BLD-MCNC: 13.6 G/DL (ref 12–16)
IMM GRANULOCYTES # BLD AUTO: 0.05 K/UL (ref 0–0.04)
IMM GRANULOCYTES NFR BLD AUTO: 0.4 % (ref 0–0.5)
INR PPP: 1.1 (ref 0.8–1.2)
LYMPHOCYTES # BLD AUTO: 0.6 K/UL (ref 1–4.8)
LYMPHOCYTES NFR BLD: 4.4 % (ref 18–48)
MAGNESIUM SERPL-MCNC: 2.1 MG/DL (ref 1.6–2.6)
MCH RBC QN AUTO: 31.3 PG (ref 27–31)
MCHC RBC AUTO-ENTMCNC: 33.8 G/DL (ref 32–36)
MCV RBC AUTO: 92 FL (ref 82–98)
MONOCYTES # BLD AUTO: 0.6 K/UL (ref 0.3–1)
MONOCYTES NFR BLD: 4.6 % (ref 4–15)
NEUTROPHILS # BLD AUTO: 11.9 K/UL (ref 1.8–7.7)
NEUTROPHILS NFR BLD: 90.4 % (ref 38–73)
NRBC BLD-RTO: 0 /100 WBC
PHOSPHATE SERPL-MCNC: 4.2 MG/DL (ref 2.7–4.5)
PLATELET # BLD AUTO: 218 K/UL (ref 150–450)
PMV BLD AUTO: 10.3 FL (ref 9.2–12.9)
POTASSIUM SERPL-SCNC: 4 MMOL/L (ref 3.5–5.1)
PROT SERPL-MCNC: 6.6 G/DL (ref 6–8.4)
PROTHROMBIN TIME: 11.9 SEC (ref 9–12.5)
RBC # BLD AUTO: 4.35 M/UL (ref 4–5.4)
SODIUM SERPL-SCNC: 140 MMOL/L (ref 136–145)
WBC # BLD AUTO: 13.15 K/UL (ref 3.9–12.7)

## 2024-12-12 PROCEDURE — 63600175 PHARM REV CODE 636 W HCPCS: Mod: JZ,JG

## 2024-12-12 PROCEDURE — 27201037 HC PRESSURE MONITORING SET UP

## 2024-12-12 PROCEDURE — 88307 TISSUE EXAM BY PATHOLOGIST: CPT | Performed by: PATHOLOGY

## 2024-12-12 PROCEDURE — 25000003 PHARM REV CODE 250

## 2024-12-12 PROCEDURE — 0FB10ZZ EXCISION OF RIGHT LOBE LIVER, OPEN APPROACH: ICD-10-PCS | Performed by: STUDENT IN AN ORGANIZED HEALTH CARE EDUCATION/TRAINING PROGRAM

## 2024-12-12 PROCEDURE — 94761 N-INVAS EAR/PLS OXIMETRY MLT: CPT

## 2024-12-12 PROCEDURE — 0FB20ZZ EXCISION OF LEFT LOBE LIVER, OPEN APPROACH: ICD-10-PCS | Performed by: STUDENT IN AN ORGANIZED HEALTH CARE EDUCATION/TRAINING PROGRAM

## 2024-12-12 PROCEDURE — 0FT40ZZ RESECTION OF GALLBLADDER, OPEN APPROACH: ICD-10-PCS | Performed by: STUDENT IN AN ORGANIZED HEALTH CARE EDUCATION/TRAINING PROGRAM

## 2024-12-12 PROCEDURE — 88341 IMHCHEM/IMCYTCHM EA ADD ANTB: CPT | Mod: 59 | Performed by: PATHOLOGY

## 2024-12-12 PROCEDURE — 36415 COLL VENOUS BLD VENIPUNCTURE: CPT | Performed by: STUDENT IN AN ORGANIZED HEALTH CARE EDUCATION/TRAINING PROGRAM

## 2024-12-12 PROCEDURE — 63600175 PHARM REV CODE 636 W HCPCS

## 2024-12-12 PROCEDURE — 80053 COMPREHEN METABOLIC PANEL: CPT

## 2024-12-12 PROCEDURE — 37000008 HC ANESTHESIA 1ST 15 MINUTES: Performed by: STUDENT IN AN ORGANIZED HEALTH CARE EDUCATION/TRAINING PROGRAM

## 2024-12-12 PROCEDURE — 85610 PROTHROMBIN TIME: CPT

## 2024-12-12 PROCEDURE — 36620 INSERTION CATHETER ARTERY: CPT | Mod: 59,,, | Performed by: ANESTHESIOLOGY

## 2024-12-12 PROCEDURE — 76998 US GUIDE INTRAOP: CPT | Mod: 26,,, | Performed by: STUDENT IN AN ORGANIZED HEALTH CARE EDUCATION/TRAINING PROGRAM

## 2024-12-12 PROCEDURE — 36000709 HC OR TIME LEV III EA ADD 15 MIN: Performed by: STUDENT IN AN ORGANIZED HEALTH CARE EDUCATION/TRAINING PROGRAM

## 2024-12-12 PROCEDURE — 86901 BLOOD TYPING SEROLOGIC RH(D): CPT

## 2024-12-12 PROCEDURE — 88342 IMHCHEM/IMCYTCHM 1ST ANTB: CPT | Performed by: PATHOLOGY

## 2024-12-12 PROCEDURE — 76942 ECHO GUIDE FOR BIOPSY: CPT

## 2024-12-12 PROCEDURE — 88304 TISSUE EXAM BY PATHOLOGIST: CPT | Performed by: PATHOLOGY

## 2024-12-12 PROCEDURE — 84100 ASSAY OF PHOSPHORUS: CPT

## 2024-12-12 PROCEDURE — 25000242 PHARM REV CODE 250 ALT 637 W/ HCPCS: Performed by: NURSE PRACTITIONER

## 2024-12-12 PROCEDURE — 94799 UNLISTED PULMONARY SVC/PX: CPT

## 2024-12-12 PROCEDURE — 71000015 HC POSTOP RECOV 1ST HR: Performed by: STUDENT IN AN ORGANIZED HEALTH CARE EDUCATION/TRAINING PROGRAM

## 2024-12-12 PROCEDURE — 63600175 PHARM REV CODE 636 W HCPCS: Mod: JZ,JG | Performed by: ANESTHESIOLOGY

## 2024-12-12 PROCEDURE — 94640 AIRWAY INHALATION TREATMENT: CPT

## 2024-12-12 PROCEDURE — 37000009 HC ANESTHESIA EA ADD 15 MINS: Performed by: STUDENT IN AN ORGANIZED HEALTH CARE EDUCATION/TRAINING PROGRAM

## 2024-12-12 PROCEDURE — 27201423 OPTIME MED/SURG SUP & DEVICES STERILE SUPPLY: Performed by: STUDENT IN AN ORGANIZED HEALTH CARE EDUCATION/TRAINING PROGRAM

## 2024-12-12 PROCEDURE — 86920 COMPATIBILITY TEST SPIN: CPT

## 2024-12-12 PROCEDURE — 71000033 HC RECOVERY, INTIAL HOUR: Performed by: STUDENT IN AN ORGANIZED HEALTH CARE EDUCATION/TRAINING PROGRAM

## 2024-12-12 PROCEDURE — 88360 TUMOR IMMUNOHISTOCHEM/MANUAL: CPT | Performed by: PATHOLOGY

## 2024-12-12 PROCEDURE — 20600001 HC STEP DOWN PRIVATE ROOM

## 2024-12-12 PROCEDURE — 47120 PARTIAL REMOVAL OF LIVER: CPT | Mod: ,,, | Performed by: STUDENT IN AN ORGANIZED HEALTH CARE EDUCATION/TRAINING PROGRAM

## 2024-12-12 PROCEDURE — 36000708 HC OR TIME LEV III 1ST 15 MIN: Performed by: STUDENT IN AN ORGANIZED HEALTH CARE EDUCATION/TRAINING PROGRAM

## 2024-12-12 PROCEDURE — 27000221 HC OXYGEN, UP TO 24 HOURS

## 2024-12-12 PROCEDURE — 83735 ASSAY OF MAGNESIUM: CPT

## 2024-12-12 PROCEDURE — 71000016 HC POSTOP RECOV ADDL HR: Performed by: STUDENT IN AN ORGANIZED HEALTH CARE EDUCATION/TRAINING PROGRAM

## 2024-12-12 PROCEDURE — 85025 COMPLETE CBC W/AUTO DIFF WBC: CPT

## 2024-12-12 PROCEDURE — 99900035 HC TECH TIME PER 15 MIN (STAT)

## 2024-12-12 RX ORDER — ONDANSETRON HYDROCHLORIDE 2 MG/ML
4 INJECTION, SOLUTION INTRAVENOUS EVERY 6 HOURS PRN
Status: DISCONTINUED | OUTPATIENT
Start: 2024-12-12 | End: 2024-12-16 | Stop reason: HOSPADM

## 2024-12-12 RX ORDER — LIDOCAINE HYDROCHLORIDE 20 MG/ML
INJECTION, SOLUTION EPIDURAL; INFILTRATION; INTRACAUDAL; PERINEURAL
Status: DISCONTINUED | OUTPATIENT
Start: 2024-12-12 | End: 2024-12-12

## 2024-12-12 RX ORDER — FENTANYL CITRATE 50 UG/ML
INJECTION, SOLUTION INTRAMUSCULAR; INTRAVENOUS
Status: DISCONTINUED | OUTPATIENT
Start: 2024-12-12 | End: 2024-12-12

## 2024-12-12 RX ORDER — ACETAMINOPHEN 500 MG
1000 TABLET ORAL
Status: DISCONTINUED | OUTPATIENT
Start: 2024-12-12 | End: 2024-12-12

## 2024-12-12 RX ORDER — HYDROMORPHONE HYDROCHLORIDE 1 MG/ML
0.2 INJECTION, SOLUTION INTRAMUSCULAR; INTRAVENOUS; SUBCUTANEOUS EVERY 5 MIN PRN
Status: DISCONTINUED | OUTPATIENT
Start: 2024-12-12 | End: 2024-12-12 | Stop reason: HOSPADM

## 2024-12-12 RX ORDER — HYDROCODONE BITARTRATE AND ACETAMINOPHEN 500; 5 MG/1; MG/1
TABLET ORAL
Status: DISCONTINUED | OUTPATIENT
Start: 2024-12-12 | End: 2024-12-16 | Stop reason: HOSPADM

## 2024-12-12 RX ORDER — HYDROMORPHONE HCL IN 0.9% NACL 6 MG/30 ML
PATIENT CONTROLLED ANALGESIA SYRINGE INTRAVENOUS CONTINUOUS
Status: DISCONTINUED | OUTPATIENT
Start: 2024-12-12 | End: 2024-12-14

## 2024-12-12 RX ORDER — FENTANYL CITRATE 50 UG/ML
25-200 INJECTION, SOLUTION INTRAMUSCULAR; INTRAVENOUS
Status: DISCONTINUED | OUTPATIENT
Start: 2024-12-12 | End: 2024-12-12 | Stop reason: HOSPADM

## 2024-12-12 RX ORDER — DEXAMETHASONE SODIUM PHOSPHATE 4 MG/ML
INJECTION, SOLUTION INTRA-ARTICULAR; INTRALESIONAL; INTRAMUSCULAR; INTRAVENOUS; SOFT TISSUE
Status: DISCONTINUED | OUTPATIENT
Start: 2024-12-12 | End: 2024-12-12

## 2024-12-12 RX ORDER — HEPARIN SODIUM 5000 [USP'U]/ML
5000 INJECTION, SOLUTION INTRAVENOUS; SUBCUTANEOUS
Status: DISCONTINUED | OUTPATIENT
Start: 2024-12-12 | End: 2024-12-12

## 2024-12-12 RX ORDER — MIDAZOLAM HYDROCHLORIDE 1 MG/ML
.5-4 INJECTION, SOLUTION INTRAMUSCULAR; INTRAVENOUS
Status: DISCONTINUED | OUTPATIENT
Start: 2024-12-12 | End: 2024-12-12 | Stop reason: HOSPADM

## 2024-12-12 RX ORDER — ROCURONIUM BROMIDE 10 MG/ML
INJECTION, SOLUTION INTRAVENOUS
Status: DISCONTINUED | OUTPATIENT
Start: 2024-12-12 | End: 2024-12-12

## 2024-12-12 RX ORDER — ENOXAPARIN SODIUM 100 MG/ML
40 INJECTION SUBCUTANEOUS EVERY 24 HOURS
Status: DISCONTINUED | OUTPATIENT
Start: 2024-12-13 | End: 2024-12-16

## 2024-12-12 RX ORDER — HALOPERIDOL 5 MG/ML
INJECTION INTRAMUSCULAR
Status: DISCONTINUED | OUTPATIENT
Start: 2024-12-12 | End: 2024-12-12

## 2024-12-12 RX ORDER — CEFAZOLIN 2 G/1
2 INJECTION, POWDER, FOR SOLUTION INTRAMUSCULAR; INTRAVENOUS
Status: DISCONTINUED | OUTPATIENT
Start: 2024-12-12 | End: 2024-12-12

## 2024-12-12 RX ORDER — METHOCARBAMOL 100 MG/ML
500 INJECTION, SOLUTION INTRAMUSCULAR; INTRAVENOUS EVERY 8 HOURS
Status: DISCONTINUED | OUTPATIENT
Start: 2024-12-12 | End: 2024-12-14

## 2024-12-12 RX ORDER — CELECOXIB 200 MG/1
400 CAPSULE ORAL
Status: DISCONTINUED | OUTPATIENT
Start: 2024-12-12 | End: 2024-12-12

## 2024-12-12 RX ORDER — ACETAMINOPHEN 10 MG/ML
1000 INJECTION, SOLUTION INTRAVENOUS EVERY 8 HOURS
Status: COMPLETED | OUTPATIENT
Start: 2024-12-12 | End: 2024-12-13

## 2024-12-12 RX ORDER — SODIUM CHLORIDE 0.9 % (FLUSH) 0.9 %
10 SYRINGE (ML) INJECTION
Status: DISCONTINUED | OUTPATIENT
Start: 2024-12-12 | End: 2024-12-12 | Stop reason: HOSPADM

## 2024-12-12 RX ORDER — METOCLOPRAMIDE HYDROCHLORIDE 5 MG/ML
10 INJECTION INTRAMUSCULAR; INTRAVENOUS EVERY 10 MIN PRN
Status: DISCONTINUED | OUTPATIENT
Start: 2024-12-12 | End: 2024-12-12

## 2024-12-12 RX ORDER — ONDANSETRON HYDROCHLORIDE 2 MG/ML
INJECTION, SOLUTION INTRAVENOUS
Status: DISCONTINUED | OUTPATIENT
Start: 2024-12-12 | End: 2024-12-12

## 2024-12-12 RX ORDER — ACETAMINOPHEN 500 MG
1000 TABLET ORAL
Status: COMPLETED | OUTPATIENT
Start: 2024-12-12 | End: 2024-12-12

## 2024-12-12 RX ORDER — PROPOFOL 10 MG/ML
VIAL (ML) INTRAVENOUS
Status: DISCONTINUED | OUTPATIENT
Start: 2024-12-12 | End: 2024-12-12

## 2024-12-12 RX ORDER — GLUCAGON 1 MG
1 KIT INJECTION
Status: DISCONTINUED | OUTPATIENT
Start: 2024-12-12 | End: 2024-12-12 | Stop reason: HOSPADM

## 2024-12-12 RX ORDER — DEXTROSE MONOHYDRATE, SODIUM CHLORIDE, AND POTASSIUM CHLORIDE 50; 1.49; 4.5 G/1000ML; G/1000ML; G/1000ML
INJECTION, SOLUTION INTRAVENOUS CONTINUOUS
Status: DISCONTINUED | OUTPATIENT
Start: 2024-12-12 | End: 2024-12-14

## 2024-12-12 RX ORDER — METRONIDAZOLE 500 MG/100ML
500 INJECTION, SOLUTION INTRAVENOUS
Status: DISCONTINUED | OUTPATIENT
Start: 2024-12-12 | End: 2024-12-12 | Stop reason: HOSPADM

## 2024-12-12 RX ORDER — CEFAZOLIN SODIUM 1 G/3ML
INJECTION, POWDER, FOR SOLUTION INTRAMUSCULAR; INTRAVENOUS
Status: DISCONTINUED | OUTPATIENT
Start: 2024-12-12 | End: 2024-12-12

## 2024-12-12 RX ORDER — PHENYLEPHRINE HCL IN 0.9% NACL 1 MG/10 ML
SYRINGE (ML) INTRAVENOUS
Status: DISCONTINUED | OUTPATIENT
Start: 2024-12-12 | End: 2024-12-12

## 2024-12-12 RX ORDER — KETAMINE HCL IN 0.9 % NACL 50 MG/5 ML
SYRINGE (ML) INTRAVENOUS
Status: DISCONTINUED | OUTPATIENT
Start: 2024-12-12 | End: 2024-12-12

## 2024-12-12 RX ORDER — ESMOLOL HYDROCHLORIDE 10 MG/ML
INJECTION INTRAVENOUS
Status: DISCONTINUED | OUTPATIENT
Start: 2024-12-12 | End: 2024-12-12

## 2024-12-12 RX ORDER — NALOXONE HCL 0.4 MG/ML
0.02 VIAL (ML) INJECTION
Status: DISCONTINUED | OUTPATIENT
Start: 2024-12-12 | End: 2024-12-14

## 2024-12-12 RX ORDER — LEVALBUTEROL INHALATION SOLUTION 0.63 MG/3ML
0.63 SOLUTION RESPIRATORY (INHALATION)
Status: COMPLETED | OUTPATIENT
Start: 2024-12-12 | End: 2024-12-14

## 2024-12-12 RX ORDER — BUPIVACAINE HYDROCHLORIDE 7.5 MG/ML
INJECTION, SOLUTION EPIDURAL; RETROBULBAR
Status: COMPLETED | OUTPATIENT
Start: 2024-12-12 | End: 2024-12-12

## 2024-12-12 RX ORDER — MIDAZOLAM HYDROCHLORIDE 1 MG/ML
INJECTION INTRAMUSCULAR; INTRAVENOUS
Status: DISCONTINUED | OUTPATIENT
Start: 2024-12-12 | End: 2024-12-12

## 2024-12-12 RX ORDER — DEXMEDETOMIDINE HYDROCHLORIDE 100 UG/ML
INJECTION, SOLUTION INTRAVENOUS
Status: DISCONTINUED | OUTPATIENT
Start: 2024-12-12 | End: 2024-12-12

## 2024-12-12 RX ORDER — ACETAMINOPHEN 500 MG
1000 TABLET ORAL EVERY 8 HOURS
Status: DISCONTINUED | OUTPATIENT
Start: 2024-12-13 | End: 2024-12-16 | Stop reason: HOSPADM

## 2024-12-12 RX ADMIN — HALOPERIDOL LACTATE 1 MG: 5 INJECTION, SOLUTION INTRAMUSCULAR at 12:12

## 2024-12-12 RX ADMIN — ACETAMINOPHEN 1000 MG: 10 INJECTION, SOLUTION INTRAVENOUS at 01:12

## 2024-12-12 RX ADMIN — SODIUM CHLORIDE: 0.9 INJECTION, SOLUTION INTRAVENOUS at 07:12

## 2024-12-12 RX ADMIN — ACETAMINOPHEN 1000 MG: 10 INJECTION, SOLUTION INTRAVENOUS at 09:12

## 2024-12-12 RX ADMIN — MIDAZOLAM 2 MG: 1 INJECTION INTRAMUSCULAR; INTRAVENOUS at 06:12

## 2024-12-12 RX ADMIN — FENTANYL CITRATE 50 MCG: 50 INJECTION INTRAMUSCULAR; INTRAVENOUS at 10:12

## 2024-12-12 RX ADMIN — Medication 10 MG: at 10:12

## 2024-12-12 RX ADMIN — FENTANYL CITRATE 50 MCG: 50 INJECTION INTRAMUSCULAR; INTRAVENOUS at 07:12

## 2024-12-12 RX ADMIN — CELECOXIB 400 MG: 200 CAPSULE ORAL at 05:12

## 2024-12-12 RX ADMIN — ROCURONIUM BROMIDE 20 MG: 10 INJECTION, SOLUTION INTRAVENOUS at 09:12

## 2024-12-12 RX ADMIN — METHOCARBAMOL 500 MG: 100 INJECTION INTRAMUSCULAR; INTRAVENOUS at 10:12

## 2024-12-12 RX ADMIN — MIDAZOLAM 1 MG: 1 INJECTION INTRAMUSCULAR; INTRAVENOUS at 06:12

## 2024-12-12 RX ADMIN — Medication 20 MG: at 07:12

## 2024-12-12 RX ADMIN — LEVALBUTEROL HYDROCHLORIDE 0.63 MG: 0.63 SOLUTION RESPIRATORY (INHALATION) at 07:12

## 2024-12-12 RX ADMIN — CEFAZOLIN 2 G: 330 INJECTION, POWDER, FOR SOLUTION INTRAMUSCULAR; INTRAVENOUS at 07:12

## 2024-12-12 RX ADMIN — BUPIVACAINE HYDROCHLORIDE 30 ML: 7.5 INJECTION, SOLUTION EPIDURAL; RETROBULBAR at 06:12

## 2024-12-12 RX ADMIN — SUGAMMADEX 200 MG: 100 INJECTION, SOLUTION INTRAVENOUS at 12:12

## 2024-12-12 RX ADMIN — FENTANYL CITRATE 25 MCG: 50 INJECTION INTRAMUSCULAR; INTRAVENOUS at 12:12

## 2024-12-12 RX ADMIN — ACETAMINOPHEN 1000 MG: 500 TABLET ORAL at 05:12

## 2024-12-12 RX ADMIN — ESMOLOL HYDROCHLORIDE 10 MG: 100 INJECTION, SOLUTION INTRAVENOUS at 08:12

## 2024-12-12 RX ADMIN — ROCURONIUM BROMIDE 30 MG: 10 INJECTION, SOLUTION INTRAVENOUS at 11:12

## 2024-12-12 RX ADMIN — ROCURONIUM BROMIDE 20 MG: 10 INJECTION, SOLUTION INTRAVENOUS at 10:12

## 2024-12-12 RX ADMIN — ROCURONIUM BROMIDE 20 MG: 10 INJECTION, SOLUTION INTRAVENOUS at 07:12

## 2024-12-12 RX ADMIN — METHOCARBAMOL 500 MG: 100 INJECTION INTRAMUSCULAR; INTRAVENOUS at 01:12

## 2024-12-12 RX ADMIN — ROCURONIUM BROMIDE 60 MG: 10 INJECTION, SOLUTION INTRAVENOUS at 07:12

## 2024-12-12 RX ADMIN — ESMOLOL HYDROCHLORIDE 10 MG: 100 INJECTION, SOLUTION INTRAVENOUS at 10:12

## 2024-12-12 RX ADMIN — Medication: at 01:12

## 2024-12-12 RX ADMIN — LIDOCAINE HYDROCHLORIDE 100 MG: 20 INJECTION, SOLUTION EPIDURAL; INFILTRATION; INTRACAUDAL; PERINEURAL at 07:12

## 2024-12-12 RX ADMIN — Medication 10 MG: at 08:12

## 2024-12-12 RX ADMIN — ROCURONIUM BROMIDE 20 MG: 10 INJECTION, SOLUTION INTRAVENOUS at 11:12

## 2024-12-12 RX ADMIN — CEFAZOLIN 2 G: 330 INJECTION, POWDER, FOR SOLUTION INTRAMUSCULAR; INTRAVENOUS at 11:12

## 2024-12-12 RX ADMIN — PROPOFOL 120 MG: 10 INJECTION, EMULSION INTRAVENOUS at 07:12

## 2024-12-12 RX ADMIN — Medication 100 MCG: at 09:12

## 2024-12-12 RX ADMIN — SODIUM CHLORIDE, SODIUM GLUCONATE, SODIUM ACETATE, POTASSIUM CHLORIDE, MAGNESIUM CHLORIDE, SODIUM PHOSPHATE, DIBASIC, AND POTASSIUM PHOSPHATE: .53; .5; .37; .037; .03; .012; .00082 INJECTION, SOLUTION INTRAVENOUS at 12:12

## 2024-12-12 RX ADMIN — DEXAMETHASONE SODIUM PHOSPHATE 8 MG: 4 INJECTION INTRA-ARTICULAR; INTRALESIONAL; INTRAMUSCULAR; INTRAVENOUS; SOFT TISSUE at 07:12

## 2024-12-12 RX ADMIN — FENTANYL CITRATE 50 MCG: 50 INJECTION INTRAMUSCULAR; INTRAVENOUS at 06:12

## 2024-12-12 RX ADMIN — METRONIDAZOLE 500 MG: 500 INJECTION, SOLUTION INTRAVENOUS at 06:12

## 2024-12-12 RX ADMIN — Medication 10 MG: at 09:12

## 2024-12-12 RX ADMIN — Medication 100 MCG: at 10:12

## 2024-12-12 RX ADMIN — ROCURONIUM BROMIDE 20 MG: 10 INJECTION, SOLUTION INTRAVENOUS at 08:12

## 2024-12-12 RX ADMIN — HEPARIN SODIUM 5000 UNITS: 5000 INJECTION INTRAVENOUS; SUBCUTANEOUS at 05:12

## 2024-12-12 RX ADMIN — DEXMEDETOMIDINE 12 MCG: 200 INJECTION, SOLUTION INTRAVENOUS at 10:12

## 2024-12-12 RX ADMIN — ONDANSETRON 4 MG: 2 INJECTION INTRAMUSCULAR; INTRAVENOUS at 12:12

## 2024-12-12 RX ADMIN — Medication 10 MG: at 11:12

## 2024-12-12 RX ADMIN — DEXMEDETOMIDINE 12 MCG: 200 INJECTION, SOLUTION INTRAVENOUS at 12:12

## 2024-12-12 RX ADMIN — Medication 10 MG: at 12:12

## 2024-12-12 RX ADMIN — DEXMEDETOMIDINE 12 MCG: 200 INJECTION, SOLUTION INTRAVENOUS at 08:12

## 2024-12-12 RX ADMIN — POTASSIUM CHLORIDE: 2 INJECTION, SOLUTION, CONCENTRATE INTRAVENOUS at 02:12

## 2024-12-12 RX ADMIN — SODIUM CHLORIDE, SODIUM GLUCONATE, SODIUM ACETATE, POTASSIUM CHLORIDE, MAGNESIUM CHLORIDE, SODIUM PHOSPHATE, DIBASIC, AND POTASSIUM PHOSPHATE: .53; .5; .37; .037; .03; .012; .00082 INJECTION, SOLUTION INTRAVENOUS at 06:12

## 2024-12-12 NOTE — BRIEF OP NOTE
[facility] - Surgery ([facility])  Brief Operative Note    SUMMARY     Surgery Date: 12/12/2024     Surgeons and Role:     * Ponce Rosado Jr., MD - Primary     * Marc aKur MD - Resident - Assisting        Pre-op Diagnosis:  Adenocarcinoma of colon [C18.9]    Post-op Diagnosis:  Post-Op Diagnosis Codes:     * Adenocarcinoma of colon [C18.9]    Procedure(s) (LRB):  PARTIAL LIVER RESECTION- SEGMENT 3 AND 5 LIVER LESION (N/A)  CHOLECYSTECTOMY    Anesthesia: General    Implants:  * No implants in log *    Operative Findings: segment 3 and 5 liver resection with cholecystectomy. See op note.     Estimated Blood Loss: 150 ml  Estimated Blood Loss has been documented.         Specimens:   Specimen (24h ago, onward)       Start     Ordered    12/12/24 1147  Specimen to Pathology, Surgery General Surgery  Once        Comments: Pre-op Diagnosis: Adenocarcinoma of colon [C18.9]Procedure(s):PARTIAL LIVER RESECTION Number of specimens: 3Name of specimens: 1. Segment 3 Liver lesion- permanent2. Gallbladder - permanent3. Segment 5 Liver lesion - permanent     References:    Click here for ordering Quick Tip   Question Answer Comment   Procedure Type: General Surgery    Specimen Class: Known or suspected malignancy    Release to patient 7 Day Delay    Reason for preventing immediate release Reasonable likelihood of causing patient harm    Additional details for preventing immediate release DOCTOR REQUEST        12/12/24 1209                    [id number]

## 2024-12-12 NOTE — NURSING TRANSFER
Nursing Transfer Note      12/12/2024   4:16 PM    Nurse giving handoff:michelle stalney  Nurse receiving handoff:michael stanley     Reason patient is being transferred: post procedure    Transfer To: [facility]    Transfer via bed    Transfer with  to O2, cardiac monitoring    Transported by transport and rn       Telemetry: bedside,   Order for Tele Monitor? Yes    Additional Lines:     Medicines sent: na    Any special needs or follow-up needed: na    Patient belongings transferred with patient: Yes    Chart send with patient: Yes    Notified: daughter      Patient reassessed at: 12/12/24 @0659

## 2024-12-12 NOTE — TRANSFER OF CARE
"Anesthesia Transfer of Care Note    Patient: Oumou Leon    Procedure(s) Performed: Procedure(s) (LRB):  PARTIAL LIVER RESECTION- SEGMENT 3 AND 5 LIVER LESION (N/A)  CHOLECYSTECTOMY    Patient location: PACU    Anesthesia Type: general    Transport from OR: Transported from OR on 6-10 L/min O2 by face mask with adequate spontaneous ventilation    Post pain: adequate analgesia    Post assessment: no apparent anesthetic complications    Post vital signs: stable    Level of consciousness: awake and alert    Nausea/Vomiting: no nausea/vomiting    Complications: none    Transfer of care protocol was followed      Last vitals: Visit Vitals  /67 (BP Location: Right arm, Patient Position: Lying)   Pulse 86   Temp 36.9 °C (98.4 °F) (Oral)   Resp 15   Ht 5' 1" (1.549 m)   Wt 71.2 kg (157 lb)   SpO2 98%   Breastfeeding No   BMI 29.66 kg/m²     "

## 2024-12-12 NOTE — ANESTHESIA PROCEDURE NOTES
Arterial    Diagnosis: Colon adenocarcinoma    Patient location during procedure: done in OR  Timeout: 12/12/2024 7:17 AM  Procedure end time: 12/12/2024 7:20 AM    Staffing  Authorizing Provider: Wilberto Fernandez Jr., MD  Performing Provider: Kurt Quintero MD    Staffing  Performed by: Kurt Quintero MD  Authorized by: Wilberto Fernandez Jr., MD    Anesthesiologist was present at the time of the procedure.    Preanesthetic Checklist  Completed: patient identified, IV checked, site marked, risks and benefits discussed, surgical consent, monitors and equipment checked, pre-op evaluation, timeout performed and anesthesia consent givenArterial  Skin Prep: chlorhexidine gluconate  Local Infiltration: none  Orientation: right  Location: radial    Catheter Size: 20 G  Catheter placement by Ultrasound guidance. Heme positive aspiration all ports.   Vessel Caliber: patent  Needle advanced into vessel with real time Ultrasound guidance.Insertion Attempts: 1  Assessment  Dressing: secured with tape and tegaderm  Patient: Tolerated well

## 2024-12-12 NOTE — ANESTHESIA POSTPROCEDURE EVALUATION
Anesthesia Post Evaluation    Patient: Oumou Leon    Procedure(s) Performed: Procedure(s) (LRB):  PARTIAL LIVER RESECTION- SEGMENT 3 AND 5 LIVER LESION (N/A)  CHOLECYSTECTOMY    Final Anesthesia Type: general      Patient location during evaluation: PACU  Patient participation: Yes- Able to Participate  Level of consciousness: awake and alert and oriented  Post-procedure vital signs: reviewed and stable  Pain management: pain needs to be addressed (titrating pain meds)  Airway patency: patent    PONV status at discharge: No PONV  Anesthetic complications: no      Cardiovascular status: stable  Respiratory status: unassisted, spontaneous ventilation and room air  Hydration status: euvolemic  Follow-up not needed.              Vitals Value Taken Time   /90 12/12/24 1331   Temp  12/12/24 1335   Pulse 78 12/12/24 1335   Resp 23 12/12/24 1335   SpO2 100 % 12/12/24 1335   Vitals shown include unfiled device data.      No case tracking events are documented in the log.      Pain/Ian Score: Pain Rating Prior to Med Admin: 0 (12/12/2024  6:55 AM)  Pain Rating Post Med Admin: 0 (12/12/2024  6:55 AM)  Ian Score: 8 (12/12/2024  1:05 PM)

## 2024-12-12 NOTE — OP NOTE
[facility]  Surgical Oncology  Operative Note           Date of Procedure: 12/12/2024   Time: 0730    Surgeons and Role:     * Pnoce Rosado Jr., MD - Primary     * Marc Kaur MD - Resident - Assisting    Pre-Operative Diagnosis:  Colorectal liver metastasis to segment 3 and 5    Post-Operative Diagnosis:   Same    Pre-Operative Variables:  Stage: IV  Adjacent organ involvement: Liver  Chemotherapy within 90 Days: Yes  Radiation Therapy within 90 Days: No    Anesthesia: General    Procedure:  Exploratory laparotomy  Partial hepatectomy of segment 3  Partial hepatectomy of segment 5  Cholecystectomy   Comprehensive intraoperative ultrasound of the liver    Operative Findings:   No evidence of distant intraperitoneal metastases   Intraoperative ultrasound performed demonstrating 4 cm lesion in segment 3 and 3 cm lesion in segment 5. No additional sites of disease appreciated within the liver. Images saved in chart  Type III portal vein anatomy, hepatic venous anatomy standard  No frozen sections sent  Resection status:  R0/R1 pending final pathology.  No gross disease remaining post dissection.  [place] time: 0 minutes         Indications:  Oumou Leon is a 54 y.o. year old female with colorectal liver metastasis.  She was diagnosed at the end of 2023 at an outside hospital and was taken for up-front primary colon resection in January 20, 2024 which she tolerated well.  She was initiated on systemic therapy a FOLFOX plus Avastin and ultimately transitioned to FOLFOX plus Vectibix which she has continued to follow with significant response on imaging.  When I met her last month she has received upwards of 20 cycles of systemic therapy.  Repeat staging including a repeat liver MRI did not demonstrate any additional sites of disease in the to lesions at hand continued to demonstrate stability, a significant response from initial diagnosis.  I offered definitive resection which would require a  partial hepatectomy x2 as well as cholecystectomy.  She presents today for definitive resection.    Risks and benefits were reviewed including bleeding, infection, pain, scar, damage to surrounding structures, cardiovascular and pulmonary complications, post hepatectomy liver failure, bile leak, recurrence of cancer, inability to remove all cancer, prolonged hospital or ICU stay, heart attack, stroke, blood clot, need for additional procedures, death, and imponderables.  She expressed understanding and gave informed consent to proceed.    Procedure in Detail:  After informed consent was obtained and the patient was properly identified, the patient was taken to the operating room and placed in the supine position. After the uneventful induction of general anesthesia the patient's abdomen was prepped and draped in the standard surgical fashion. A time-out was performed according to the [facility] guidelines.  A midline incision was made with a 10 blade scalpel and sharp dissection was carried down with Bovie electrocautery.  The fascia was incised and access was gained into the abdominal cavity without incident.  The Figueroa retractor was placed and the falciform ligament was taken down from the anterior abdominal wall carefully.  A complete exploration ensued and there was no evidence of peritoneal metastasis or additional sites of metastasis other than her to known liver lesions.  I proceeded to mobilize the liver.  The coronary ligament was divided and dissection was carried laterally dividing the triangular ligament of the right hepatic lobe.  I did not mobilize the left hepatic lobe as this was not required for appropriate exposure.  A comprehensive intraoperative ultrasound of the liver was then performed.  She demonstrated standard hepatic venous anatomy, type 3 portal venous anatomy.  The segment 3 liver metastasis was identified at the termination of the left portal vein, this lesion measured  approximately 4 cm in greatest diameter.  The segment 5 liver metastasis was also identified abutting the gallbladder fossa and this measured approximately 3 cm in greatest diameter.  No additional lesions were appreciated within the liver parenchyma.  Representative images of the ultrasound were saved in the patient's chart under the media section. The michele hepatis was encircled by placing a umbilical tape through the foramen of Indigo encircling the hepatoduodenal ligament.  A red rubber was placed over the umbilical tape creating a Rumel tourniquet for [place] maneuver.  Ultrasound was again used to delineate appropriate transection margins at the 2 liver lesions.  Once this was accomplished, Prolene stay sutures were placed on either side of the transection lines and we proceeded with the segment 3 resection.  Liver parenchyma was transected using a combination of Bovie electrocautery, LigaSure, Cusa ensuring to clip or tie any major biliary structures or vasculature.  The segment 3 mass was dissected carefully with the Cusa device especially along the portal venous margin ensuring to ligate the segment 3 branch.  Dissection continued until the lesion remained only attached by the termination of the left portal vein which was transected with a vascular staple load.  The specimen was inspected grossly on the back table in the mass appeared to be completely surrounded by hepatic parenchyma.  A ultrasound was then performed to evaluate the hepatic vasculature.  The left hepatic vein, the left portal vein as well as the segment 2 portal venous branch demonstrated adequate flow.    Attention was then turned to the segment 5 lesion.  To facilitate appropriate resection margin, a cholecystectomy was required.  This is performed in the standard top-down approach until the gallbladder was isolated on the cystic duct and cystic artery alone.  These structures were divided between ties and the gallbladder was sent for  final pathology.  The segment 5 lesion was resected in a similar fashion utilizing stay sutures on either side of the transection plane for exposure.  Again a combination of LigaSure, Bovie electrocautery, Cusa was used for parenchymal transection.  Once the specimen was freed it was sent for final pathology.  Both resection cavities including the gallbladder fossa was inspected and hemostasis was confirmed.  Valsalva maneuver was performed to evaluate for bile leak and there was no evidence of bile leakage in either resection bed.  A final ultrasound was performed of the liver again confirming adequate flow through the hepatic venous and arterial system, portal venous system including the left portal vein and segment 2 portal branch.  Nu knit was placed in the resection beds and the retractor was removed.  The fascia was then closed with a running #1 PDS suture.  Subcutaneous tissue was irrigated, dermis approximated with interrupted Vicryl and skin closed with Monocryl suture.  Dermabond was used for sterile dressing.    The patient tolerated the procedure well and there were no immediate complications.  All lap needle and sponge counts were correct x2.  She was extubated and taken to the recovery room in stable condition.      Portions of the record were created with [employer] Direct voice recognition software. This may lead to occasional typographical errors due to the inherent limitations of the software. Read the chart carefully and recognize, using context, where substitutions have occurred. Please do not hesitate to contact me directly if clarification is needed.    Implants: None    Drains: None    Estimated Blood Loss (EBL):   150 mL    Specimens:   Specimen (24h ago, onward)       Start     Ordered    12/12/24 1147  Specimen to Pathology, Surgery General Surgery  Once        Comments: Pre-op Diagnosis: Adenocarcinoma of colon [C18.9]Procedure(s):PARTIAL LIVER RESECTION Number of specimens: 3Name of  specimens: 1. Segment 3 Liver lesion- permanent2. Gallbladder - permanent3. Segment 5 Liver lesion - permanent     References:    Click here for ordering Quick Tip   Question Answer Comment   Procedure Type: General Surgery    Specimen Class: Known or suspected malignancy    Release to patient 7 Day Delay    Reason for preventing immediate release Reasonable likelihood of causing patient harm    Additional details for preventing immediate release DOCTOR REQUEST        12/12/24 7778                            Condition: Stable    Disposition: PACU - hemodynamically stable.    Attestation: I was present and scrubbed for the entire procedure including all described portions above.             Ponce Rosado Jr, MD      Surgical Oncology      12/12/2024, 1:55 PM;

## 2024-12-12 NOTE — ANESTHESIA PROCEDURE NOTES
Bilateral CONY block    Patient location during procedure: pre-op   Block not for primary anesthetic.  Reason for block: at surgeon's request and post-op pain management   Post-op Pain Location: bilateral abdominal pain   Start time: 12/12/2024 6:31 AM  Timeout: 12/12/2024 6:30 AM   End time: 12/12/2024 6:35 AM    Staffing  Authorizing Provider: Elier Garcia MD  Performing Provider: Laura Brian MD    Staffing  Performed by: Laura Brian MD  Authorized by: Elier Garcia MD    Preanesthetic Checklist  Completed: patient identified, IV checked, site marked, risks and benefits discussed, surgical consent, monitors and equipment checked, pre-op evaluation and timeout performed  Peripheral Block  Patient position: sitting  Prep: ChloraPrep  Patient monitoring: heart rate, cardiac monitor, continuous pulse ox, continuous capnometry and frequent blood pressure checks  Block type: erector spinae plane  Laterality: bilateral  Injection technique: single shot  Interspace: T7-8    Needle  Needle type: Stimuplex   Needle gauge: 20 G  Needle length: 4 in  Needle localization: anatomical landmarks and ultrasound guidance   -ultrasound image captured on disc.  Assessment  Injection assessment: negative aspiration, negative parasthesia and local visualized surrounding nerve  Paresthesia pain: none  Heart rate change: no  Slow fractionated injection: yes    Medications:    Medications: bupivacaine (pf) (MARCAINE) injection 0.75% - Perineural   30 mL - 12/12/2024 6:35:00 AM    Additional Notes  Patient tolerated well.  See [facility] RN record for vitals.  30 cc 0.375% bupivacaine with 1:300,000 epi administered on each side

## 2024-12-12 NOTE — ANESTHESIA PROCEDURE NOTES
Intubation    Date/Time: 12/12/2024 7:12 AM    Performed by: Kurt Quintero MD  Authorized by: Wilberto Fernandez Jr., MD    Intubation:     Induction:  Intravenous    Intubated:  Postinduction    Mask Ventilation:  Easy mask    Attempts:  1    Attempted By:  Resident anesthesiologist    Method of Intubation:  Video laryngoscopy    Blade:  James 3    Laryngeal View Grade: Grade IIA - cords partially seen      Difficult Airway Encountered?: No      Complications:  None    Airway Device:  Oral endotracheal tube    Airway Device Size:  7.0    Style/Cuff Inflation:  Cuffed (inflated to minimal occlusive pressure)    Tube secured:  21    Secured at:  The teeth    Placement Verified By:  Capnometry    Complicating Factors:  None    Findings Post-Intubation:  BS equal bilateral and atraumatic/condition of teeth unchanged

## 2024-12-13 DIAGNOSIS — C18.9 ADENOCARCINOMA OF COLON: Primary | ICD-10-CM

## 2024-12-13 LAB
ALBUMIN SERPL BCP-MCNC: 3.1 G/DL (ref 3.5–5.2)
ALP SERPL-CCNC: 142 U/L (ref 40–150)
ALT SERPL W/O P-5'-P-CCNC: 205 U/L (ref 10–44)
ANION GAP SERPL CALC-SCNC: 6 MMOL/L (ref 8–16)
AST SERPL-CCNC: 253 U/L (ref 10–40)
BASOPHILS # BLD AUTO: 0.01 K/UL (ref 0–0.2)
BASOPHILS NFR BLD: 0.1 % (ref 0–1.9)
BILIRUB SERPL-MCNC: 0.6 MG/DL (ref 0.1–1)
BUN SERPL-MCNC: 11 MG/DL (ref 6–20)
CALCIUM SERPL-MCNC: 8.4 MG/DL (ref 8.7–10.5)
CHLORIDE SERPL-SCNC: 109 MMOL/L (ref 95–110)
CO2 SERPL-SCNC: 22 MMOL/L (ref 23–29)
CREAT SERPL-MCNC: 0.6 MG/DL (ref 0.5–1.4)
DIFFERENTIAL METHOD BLD: ABNORMAL
EOSINOPHIL # BLD AUTO: 0 K/UL (ref 0–0.5)
EOSINOPHIL NFR BLD: 0 % (ref 0–8)
ERYTHROCYTE [DISTWIDTH] IN BLOOD BY AUTOMATED COUNT: 13 % (ref 11.5–14.5)
EST. GFR  (NO RACE VARIABLE): >60 ML/MIN/1.73 M^2
GLUCOSE SERPL-MCNC: 129 MG/DL (ref 70–110)
HCT VFR BLD AUTO: 35 % (ref 37–48.5)
HGB BLD-MCNC: 11.4 G/DL (ref 12–16)
IMM GRANULOCYTES # BLD AUTO: 0.03 K/UL (ref 0–0.04)
IMM GRANULOCYTES NFR BLD AUTO: 0.3 % (ref 0–0.5)
LYMPHOCYTES # BLD AUTO: 1 K/UL (ref 1–4.8)
LYMPHOCYTES NFR BLD: 11.3 % (ref 18–48)
MAGNESIUM SERPL-MCNC: 2.1 MG/DL (ref 1.6–2.6)
MCH RBC QN AUTO: 30 PG (ref 27–31)
MCHC RBC AUTO-ENTMCNC: 32.6 G/DL (ref 32–36)
MCV RBC AUTO: 92 FL (ref 82–98)
MONOCYTES # BLD AUTO: 1.2 K/UL (ref 0.3–1)
MONOCYTES NFR BLD: 13.2 % (ref 4–15)
NEUTROPHILS # BLD AUTO: 6.6 K/UL (ref 1.8–7.7)
NEUTROPHILS NFR BLD: 75.1 % (ref 38–73)
NRBC BLD-RTO: 0 /100 WBC
PHOSPHATE SERPL-MCNC: 2.3 MG/DL (ref 2.7–4.5)
PHOSPHATE SERPL-MCNC: 3.1 MG/DL (ref 2.7–4.5)
PLATELET # BLD AUTO: 222 K/UL (ref 150–450)
PMV BLD AUTO: 10.5 FL (ref 9.2–12.9)
POTASSIUM SERPL-SCNC: 4.3 MMOL/L (ref 3.5–5.1)
PROT SERPL-MCNC: 6.1 G/DL (ref 6–8.4)
RBC # BLD AUTO: 3.8 M/UL (ref 4–5.4)
SODIUM SERPL-SCNC: 137 MMOL/L (ref 136–145)
WBC # BLD AUTO: 8.85 K/UL (ref 3.9–12.7)

## 2024-12-13 PROCEDURE — 97530 THERAPEUTIC ACTIVITIES: CPT

## 2024-12-13 PROCEDURE — 97116 GAIT TRAINING THERAPY: CPT

## 2024-12-13 PROCEDURE — 25000003 PHARM REV CODE 250

## 2024-12-13 PROCEDURE — 84100 ASSAY OF PHOSPHORUS: CPT

## 2024-12-13 PROCEDURE — 63600175 PHARM REV CODE 636 W HCPCS: Performed by: STUDENT IN AN ORGANIZED HEALTH CARE EDUCATION/TRAINING PROGRAM

## 2024-12-13 PROCEDURE — 99900035 HC TECH TIME PER 15 MIN (STAT)

## 2024-12-13 PROCEDURE — 83735 ASSAY OF MAGNESIUM: CPT

## 2024-12-13 PROCEDURE — 27000646 HC AEROBIKA DEVICE

## 2024-12-13 PROCEDURE — 85025 COMPLETE CBC W/AUTO DIFF WBC: CPT

## 2024-12-13 PROCEDURE — 94799 UNLISTED PULMONARY SVC/PX: CPT | Mod: XB

## 2024-12-13 PROCEDURE — 94664 DEMO&/EVAL PT USE INHALER: CPT

## 2024-12-13 PROCEDURE — 80053 COMPREHEN METABOLIC PANEL: CPT

## 2024-12-13 PROCEDURE — 25000003 PHARM REV CODE 250: Performed by: STUDENT IN AN ORGANIZED HEALTH CARE EDUCATION/TRAINING PROGRAM

## 2024-12-13 PROCEDURE — 97535 SELF CARE MNGMENT TRAINING: CPT

## 2024-12-13 PROCEDURE — 63600175 PHARM REV CODE 636 W HCPCS

## 2024-12-13 PROCEDURE — 94761 N-INVAS EAR/PLS OXIMETRY MLT: CPT

## 2024-12-13 PROCEDURE — 94640 AIRWAY INHALATION TREATMENT: CPT

## 2024-12-13 PROCEDURE — 97161 PT EVAL LOW COMPLEX 20 MIN: CPT

## 2024-12-13 PROCEDURE — 84100 ASSAY OF PHOSPHORUS: CPT | Mod: 91 | Performed by: STUDENT IN AN ORGANIZED HEALTH CARE EDUCATION/TRAINING PROGRAM

## 2024-12-13 PROCEDURE — 25000242 PHARM REV CODE 250 ALT 637 W/ HCPCS: Performed by: NURSE PRACTITIONER

## 2024-12-13 PROCEDURE — 20600001 HC STEP DOWN PRIVATE ROOM

## 2024-12-13 PROCEDURE — 97165 OT EVAL LOW COMPLEX 30 MIN: CPT

## 2024-12-13 RX ORDER — KETOROLAC TROMETHAMINE 10 MG/1
10 TABLET, FILM COATED ORAL EVERY 6 HOURS
Status: DISCONTINUED | OUTPATIENT
Start: 2024-12-13 | End: 2024-12-16 | Stop reason: HOSPADM

## 2024-12-13 RX ADMIN — ENOXAPARIN SODIUM 40 MG: 40 INJECTION SUBCUTANEOUS at 05:12

## 2024-12-13 RX ADMIN — METHOCARBAMOL 500 MG: 100 INJECTION INTRAMUSCULAR; INTRAVENOUS at 10:12

## 2024-12-13 RX ADMIN — LEVALBUTEROL HYDROCHLORIDE 0.63 MG: 0.63 SOLUTION RESPIRATORY (INHALATION) at 07:12

## 2024-12-13 RX ADMIN — ACETAMINOPHEN 1000 MG: 500 TABLET ORAL at 01:12

## 2024-12-13 RX ADMIN — Medication: at 09:12

## 2024-12-13 RX ADMIN — KETOROLAC TROMETHAMINE 10 MG: 10 TABLET, FILM COATED ORAL at 05:12

## 2024-12-13 RX ADMIN — LEVALBUTEROL HYDROCHLORIDE 0.63 MG: 0.63 SOLUTION RESPIRATORY (INHALATION) at 02:12

## 2024-12-13 RX ADMIN — ACETAMINOPHEN 1000 MG: 500 TABLET ORAL at 10:12

## 2024-12-13 RX ADMIN — ACETAMINOPHEN 1000 MG: 10 INJECTION, SOLUTION INTRAVENOUS at 05:12

## 2024-12-13 RX ADMIN — KETOROLAC TROMETHAMINE 10 MG: 10 TABLET, FILM COATED ORAL at 01:12

## 2024-12-13 RX ADMIN — POTASSIUM CHLORIDE: 2 INJECTION, SOLUTION, CONCENTRATE INTRAVENOUS at 07:12

## 2024-12-13 RX ADMIN — SODIUM PHOSPHATE, MONOBASIC, MONOHYDRATE AND SODIUM PHOSPHATE, DIBASIC, ANHYDROUS 39.99 MMOL: 142; 276 INJECTION, SOLUTION INTRAVENOUS at 07:12

## 2024-12-13 RX ADMIN — METHOCARBAMOL 500 MG: 100 INJECTION INTRAMUSCULAR; INTRAVENOUS at 05:12

## 2024-12-13 RX ADMIN — METHOCARBAMOL 500 MG: 100 INJECTION INTRAMUSCULAR; INTRAVENOUS at 03:12

## 2024-12-13 RX ADMIN — LEVALBUTEROL HYDROCHLORIDE 0.63 MG: 0.63 SOLUTION RESPIRATORY (INHALATION) at 08:12

## 2024-12-13 NOTE — PLAN OF CARE
Post-Acute Therapy Recommendation: no further therapy indicated      Evaluation completed today. PT goals appropriate.    Please continue Progressive Mobility Protocol as appropriate.    Appropriate transfer level with nursing staff: bed <> chair assist x 1    2024    Problem: Physical Therapy  Goal: Physical Therapy Goal  Description: Goals to be met by: 2024     Patient will increase functional independence with mobility by performin. Sit to stand transfer with Modified [place]  2. Bed to chair transfer with Modified [place] using LRAD  3. Gait  x 250 feet with Supervision using RW or LRAD.   4. Stand for 5 minutes with Supervision using LRAD while performing dynamic balance task  5. Lower extremity exercise program x30 reps per handout, with independence    Outcome: Progressing

## 2024-12-13 NOTE — PROGRESS NOTES
[facility]  General Surgery  Progress Note    Subjective:     History of Present Illness:  No notes on file    Post-Op Info:  Procedure(s) (LRB):  PARTIAL LIVER RESECTION- SEGMENT 3 AND 5 LIVER LESION (N/A)  CHOLECYSTECTOMY   1 Day Post-Op     Interval History: POD 1 s/p segment 3 and 5 liver resection with cholecystectomy. Pt complains of pain this AM and nervous to have us palpate her abdomen; endorses using PCA frequently. Denies N/V. Denies flatus/BM.     Medications:  Continuous Infusions:   dextrose 5 % and 0.45 % NaCl with KCl 20 mEq   Intravenous Continuous 80 mL/hr at 12/13/24 0517 Rate Change at 12/13/24 0517    hydromorphone in 0.9 % NaCl 6 mg/30 ml   Intravenous Continuous   New Syringe/Bag at 12/12/24 1352     Scheduled Meds:   acetaminophen  1,000 mg Oral Q8H    enoxparin  40 mg Subcutaneous Q24H (prophylaxis, 1700)    levalbuterol  0.63 mg Nebulization Q6H WAKE    methocarbamol injection  500 mg Intravenous Q8H     PRN Meds:  Current Facility-Administered Medications:     0.9%  NaCl infusion (for blood administration), , Intravenous, Q24H PRN    dextrose 10%, 12.5 g, Intravenous, PRN    dextrose 10%, 25 g, Intravenous, PRN    naloxone, 0.02 mg, Intravenous, PRN    ondansetron, 4 mg, Intravenous, Q6H PRN     Review of patient's allergies indicates:  No Known Allergies  Objective:     Vital Signs (Most Recent):  Temp: 98.5 °F (36.9 °C) (12/13/24 0300)  Pulse: 69 (12/13/24 0404)  Resp: 12 (12/13/24 0300)  BP: 139/76 (12/13/24 0202)  SpO2: 100 % (12/13/24 0300) Vital Signs (24h Range):  Temp:  [98.3 °F (36.8 °C)-98.5 °F (36.9 °C)] 98.5 °F (36.9 °C)  Pulse:  [69-90] 69  Resp:  [12-26] 12  SpO2:  [94 %-100 %] 100 %  BP: (122-162)/(67-97) 139/76  Arterial Line BP: (128-185)/(60-90) 128/60     Weight: 71.2 kg (157 lb)  Body mass index is 29.66 kg/m².    Intake/Output - Last 3 Shifts         12/11 0700  12/12 0659 12/12 0700  12/13 0659    I.V. (mL/kg)  1174.5 (16.5)    IV Piggyback   2253.9    Total Intake(mL/kg)  3428.5 (48.2)    Urine (mL/kg/hr)  1505 (0.9)    Total Output  1505    Net  +1923.5                   Physical Exam  Vitals reviewed.   Constitutional:       General: She is not in acute distress.     Appearance: Normal appearance.   HENT:      Head: Normocephalic.      Nose:      Comments: NC  Eyes:      Extraocular Movements: Extraocular movements intact.   Cardiovascular:      Rate and Rhythm: Normal rate.      Pulses: Normal pulses.   Pulmonary:      Effort: Pulmonary effort is normal. No respiratory distress.      Breath sounds: No wheezing.   Abdominal:      Palpations: Abdomen is soft.      Tenderness: There is no guarding or rebound.      Comments: Appropriately TTP. Midline incision c/d/I with overlying dermabond.    Genitourinary:     Comments: raciel  Musculoskeletal:         General: Normal range of motion.      Cervical back: Normal range of motion.   Skin:     General: Skin is warm.      Capillary Refill: Capillary refill takes less than 2 seconds.   Neurological:      General: No focal deficit present.      Mental Status: She is alert and oriented to person, place, and time.   Psychiatric:         Mood and Affect: Mood normal.          Significant Labs:  I have reviewed all pertinent lab results within the past 24 hours.  CBC:   Recent Labs   Lab 12/13/24  0341   WBC 8.85   RBC 3.80*   HGB 11.4*   HCT 35.0*      MCV 92   MCH 30.0   MCHC 32.6     CMP:   Recent Labs   Lab 12/13/24  0341   *   CALCIUM 8.4*   ALBUMIN 3.1*   PROT 6.1      K 4.3   CO2 22*      BUN 11   CREATININE 0.6   ALKPHOS 142   *   *   BILITOT 0.6       Significant Diagnostics:  I have reviewed all pertinent imaging results/findings within the past 24 hours.  Assessment/Plan:     Adenocarcinoma of colon  Oumou Leon is a 54 y.o. F w/ PMHx including colorectal liver metastasis . Now s/p segment 3 and 5 liver resection with cholecystectomy with Dr. Rosado on  12/13/24    - Diet: CLD  - mIVF: @80  (D5 1/2NS 20KCl)  - Pain: PCA, multimodals  - Yeung: d/c today       - TOV today  - d/c arterial line  - Antibiotics: N/A  - Home Meds: will restart as appropriate  - DVT Prophylaxis: lovenox  - Labs: Daily CBC, CMP, Mg, Ph - replete lytes prn  - PRN nausea control  - Endo: sliding scale insulin  - Aggressive pulmonary toilet: IS, acapella, Xopenex nebs  - OOB/ambulate, PT/OT    Dispo: Continue inpatient care.       Mitzy Frost MD  General Surgery  [facility]

## 2024-12-13 NOTE — PT/OT/SLP EVAL
Physical Therapy Co-Evaluation and Co-Treatment    Patient Name:  Oumou Leon   MRN:  [id number]  Admit Date: 12/12/2024  Admitting Diagnosis:  <principal problem not specified>   Length of Stay: 1 days  Recent Surgery: Procedure(s) (LRB):  PARTIAL LIVER RESECTION- SEGMENT 3 AND 5 LIVER LESION (N/A)  CHOLECYSTECTOMY 1 Day Post-Op    Recommendations:     Discharge Recommendations: Low Intensity Therapy  Discharge Equipment Recommendations: none   Barriers to discharge: None    Appropriate transfer level with nursing staff: bed <> chair and in-room ambulation assist x 1    Plan:     During this hospitalization, patient to be seen 4 x/week to address the identified rehab impairments via gait training, therapeutic activities, therapeutic exercises, neuromuscular re-education and progress towards the established goals.  Plan of Care Expires:  01/12/25  Plan of Care Reviewed with: patient    Assessment     Oumou Leon is a 54 y.o. female admitted with a medical diagnosis of <principal problem not specified>. Pt tolerated initial evaluation well today. Patient is s/p ex-lap, partial hepatectomy, cholecystectomy on 12/12/24. Pt demonstrated good functional strength and tolerance to upright activity on this date, requiring minimal to no physical assist for transfers and ambulation. VS stable throughout session. Pt did have generalized unsteadiness when standing requiring CGA with use of HHA throughout ambulation trial for patient safety. Pt with increased post-op pain as well as decreased tolerance to functional activity limiting mobility, especially bed mobility, upon evaluation this date. Pt will continue to benefit from skilled PT services during this hospital admit to continue to improve transfer ability and efficiency as well as continue to progress pt's ambulation distance and cardiopulmonary endurance to maximize pt's functional independence and return to PLOF.     Problem List: impaired endurance, impaired self  "care skills, impaired functional mobility, gait instability, impaired balance, pain, decreased safety awareness, impaired skin, edema.  Rehab Prognosis: Good; patient would benefit from acute skilled PT services to address these deficits and reach maximum level of function.      Goals:   Multidisciplinary Problems       Physical Therapy Goals          Problem: Physical Therapy    Goal Priority Disciplines Outcome Interventions   Physical Therapy Goal     PT, PT/OT Progressing    Description: Goals to be met by: 2024     Patient will increase functional independence with mobility by performin. Sit to stand transfer with Modified [place]  2. Bed to chair transfer with Modified [place] using LRAD  3. Gait  x 250 feet with Supervision using RW or LRAD.   4. Stand for 5 minutes with Supervision using LRAD while performing dynamic balance task  5. Lower extremity exercise program x30 reps per handout, with independence                         Subjective     RN notified prior to session. No family/friends present upon PT entrance into room. Patient agreeable to PT evaluation.    Chief Complaint: No chief complaint on file.  Patient/Family Comments/goals: "I'm scared to throw up I think it will hurt"  Pain/Comfort:  Pain Rating 1: 6/10  Location - Side 1: Bilateral  Location 1: abdomen  Pain Addressed 1: Reposition, Distraction  Pain Rating Post-Intervention 1: other (see comments) (did not rate)    Social History:  Residence: Patient lives with their 2 daughters in a single story house with 0 MONSERRAT. Pt's bathroom has a tub/shower.  Equipment Owned (not using): rollator  Equipment Used: none  Prior level of function:  Prior to admission, patient was independent  Work: Employed - pt works full time as a   Drive: yes.   Managing Medicines/Managing Home: yes.   Hobbies: spending time with family  Assistance Upon Discharge: family    Objective:     Additional staff present: OT and student PT; OT " "for co-evaluation due to suspected patient need for two skilled therapists to appropriately assess patient's functional deficits as well as ensure patient safety, accommodate for limited activity tolerance, and provide appropriate, skilled assistance to maximize functional potential during evaluation.    Patient found HOB elevated with: telemetry, blood pressure cuff, pulse ox (continuous), peripheral IV, arterial line, schrader catheter, PCA     General Precautions: Standard, Cardiac fall   Orthopedic Precautions:N/A   Braces: N/A   Body mass index is 29.66 kg/m².  Oxygen Device: Room Air  Vitals: /67 (Patient Position: Lying)   Pulse 76   Temp 98.3 °F (36.8 °C) (Oral)   Resp 18   Ht 5' 1" (1.549 m)   Wt 71.2 kg (157 lb)   SpO2 95%   Breastfeeding No   BMI 29.66 kg/m²     Exams:  Cognition:   Alert and Cooperative   Patient is oriented to Person, Place, Time, Situation  Command following: Follows multistep verbal commands  Fluency: clear/fluent  Hearing: Intact  Vision:  Intact  Skin Integrity: Visible skin intact  Postural Assessment: slouched posture and rounded shoulders  Physical Exam:    Left UE Left LE Right UE Right LE   Edema absent absent absent absent   ROM AROM WFL AROM WFL AROM WFL AROM WFL   Strength adequate ROM, adequate strength adequate ROM, adequate strength adequate ROM, adequate strength adequate ROM, adequate strength   Sensation intact to light touch intact to light touch intact to light touch intact to light touch   Coordination normal normal normal normal     Outcome Measures:  AM-PAC 6 CLICK MOBILITY  Turning over in bed (including adjusting bedclothes, sheets and blankets)?: 2  Sitting down on and standing up from a chair with arms (e.g., wheelchair, bedside commode, etc.): 3  Moving from lying on back to sitting on the side of the bed?: 2  Moving to and from a bed to a chair (including a wheelchair)?: 3  Need to walk in hospital room?: 3  Climbing 3-5 steps with a railing?: " 2  Basic Mobility Total Score: 15     Functional Mobility:    Bed Mobility:   Sit to Supine: moderate assistance for LE management and for trunk management; to Rt side of bed    Sitting Balance at Edge of Bed:  Static Sitting Balance: Good- : able to accept minimal resistance  Dynamic Sitting Balance: Good- : able to sit unsupported and weight shift across midline minimally  Assistance Level Required: Stand-by Assistance    Transfers:   Sit <> Stand Transfer: contact guard assistance with hand-held assist. [id number] from bedside chair  Chair <> Bed Transfer Step Transfer technique: contact guard assistance with hand-held assist. Bed on the Rt    Standing Balance:  Static Standing Balance: Fair : able to stand unsupported without UE support and without LOB for 1 minute  Dynamic Standing Balance: Fair : stand independently unsupported, weight shift, and reach ipsilaterally. LOB noted when crossing midline.  Assistance Level Required: Contact Guard Assistance  Patient used: no assistive device   Time: ~4 minutes  Postural deviations noted: slouched posture and rounded shoulders  Comments: .Time in unsupported standing focused on cardiopulmonary tolerance to unsupported standing as well as strength/endurance to perform dynamic balance activity safely. No UE support needed throughout and pt able to complete balance challenges with anterior reaches inside GILMA with no LOB while performing standing balance task at sink. PT facilitating appropriate balance response as OT assisted with ADLs.      Gait:   Patient ambulated: 10 ft + 4 steps to chair   Patient required: contact guard assistance  Patient used:  hand-held assist   Gait Pattern observed: reciprocal gait  Gait Deviation(s): decreased step length, wide base of support, decreased weight shift, decreased foot clearance, flexed posture, decreased rodrigo, and shuffle gait  Impairments due to: pain and decreased endurance  all lines remained intact throughout ambulation  trial  Comments: Patient required cues for stride length, upright posture, and width of base of support to increase independence and safety. Patient required cues ~ 25% of the time. No overt LOB, primarily guarding 2/2 pain    Education:  Time provided for education, counseling and discussion of health disposition in regards to patient's current status  All questions answered within PT scope of practice and to patient's satisfaction  PT role in POC to address current functional deficits  Pt educated on proper body mechanics, safety techniques, and energy conservation with PT facilitation and cueing throughout session  Call nursing/pct to transfer to chair/use bathroom. Pt stated understanding.  Whiteboard updated with therapist name and pt's current mobility status documented above  Safe to perform step transfer to/from chair/bedside commode assist x 1  and HHA w/ nursing/PCT present  Importance of OOB tolerance 8-10 hrs/day to improve lung ventilation and expansion as well as strengthen postural musculature    Patient left HOB elevated with all lines intact and call button in reach.    History:     History reviewed. No pertinent past medical history.    Past Surgical History:   Procedure Laterality Date    CHOLECYSTECTOMY  12/12/2024    Procedure: CHOLECYSTECTOMY;  Surgeon: Ponce Rosado Jr., MD;  Location: [facility] OR [address] [place] [address];  Service: General;;    EXCISION, LIVER N/A 12/12/2024    Procedure: PARTIAL LIVER RESECTION- SEGMENT 3 AND 5 LIVER LESION;  Surgeon: Ponce Rosado Jr., MD;  Location: [facility] OR [address] [place] [address];  Service: General;  Laterality: N/A;  confirmed CUSA   Biplane probe   Valencia Nicole       No family history on file.    Social History     Socioeconomic History    Marital status: Single   Tobacco Use    Smoking status: Never     Passive exposure: Never    Smokeless tobacco: Never     Social Drivers of Health     Financial Resource Strain: Low Risk  (12/13/2024)    Overall Financial Resource Strain  (CARDIA)     Difficulty of Paying Living Expenses: Not hard at all   Food Insecurity: No Food Insecurity (12/13/2024)    Hunger Vital Sign     Worried About Running Out of Food in the Last Year: Never true     Ran Out of Food in the Last Year: Never true   Transportation Needs: No Transportation Needs (12/13/2024)    TRANSPORTATION NEEDS     Transportation : No   Physical Activity: Insufficiently Active (5/29/2023)    Received from [facility]    Exercise Vital Sign     Days of Exercise per Week: 2 days     Minutes of Exercise per Session: 60 min   Stress: No Stress Concern Present (12/13/2024)    [employer] - Occupational Stress Questionnaire     Feeling of Stress : Not at all   Housing Stability: Low Risk  (12/13/2024)    Housing Stability Vital Sign     Unable to Pay for Housing in the Last Year: No     Homeless in the Last Year: No       Time Tracking:     PT Received On: 12/13/24  PT Start Time: 1348     PT Stop Time: 1424  PT Total Time (min): 36 min     Billable Minutes: Evaluation 10 minutes, Gait Training 10 minutes, and Therapeutic Activity 16 minutes    12/13/2024

## 2024-12-13 NOTE — ASSESSMENT & PLAN NOTE
Oumou Leon is a 54 y.o. F w/ PMHx including colorectal liver metastasis . Now s/p segment 3 and 5 liver resection with cholecystectomy with Dr. Rosado on 12/13/24    - Diet: CLD  - mIVF: @80  (D5 1/2NS 20KCl)  - Pain: PCA, multimodals  - Yeung: d/c today       - TOV today  - d/c arterial line  - Antibiotics: N/A  - Home Meds: will restart as appropriate  - DVT Prophylaxis: lovenox  - Labs: Daily CBC, CMP, Mg, Ph - replete lytes prn  - PRN nausea control  - Endo: sliding scale insulin  - Aggressive pulmonary toilet: IS, acapella, Xopenex nebs  - OOB/ambulate, PT/OT    Dispo: Continue inpatient care.

## 2024-12-13 NOTE — PT/OT/SLP EVAL
"Occupational Therapy   Evaluation and Treatment    Co-evaluation with PT to have 2 skilled therapists present to safely assess pt's functional mobility.     Name: Oumou Leon  MRN: [id number]  Admitting Diagnosis: <principal problem not specified>  Recent Surgery: Procedure(s) (LRB):  PARTIAL LIVER RESECTION- SEGMENT 3 AND 5 LIVER LESION (N/A)  CHOLECYSTECTOMY 1 Day Post-Op    Recommendations:     Discharge Recommendations: No Therapy Indicated  Discharge Equipment Recommendations:  none  Barriers to discharge:  None    Assessment:     Oumou Leon is a 54 y.o. female with a medical diagnosis of <principal problem not specified>.  Pt tolerated session well despite pain and nausea.  She requires (A) with ADLs and mobility and is performing below her functional baseline.  Due to her current level of function and her home environment, no post-acute OT services are anticipated.  She presents with the following.  Performance deficits affecting function: weakness, impaired endurance, impaired self care skills, impaired functional mobility, gait instability, impaired balance, pain, impaired skin, edema, decreased safety awareness, impaired cardiopulmonary response to activity.      Rehab Prognosis: Good; patient would benefit from acute skilled OT services to address these deficits and reach maximum level of function.       Plan:     Patient to be seen 4 x/week to address the above listed problems via self-care/home management, therapeutic activities, therapeutic exercises  Plan of Care Expires: 01/11/25  Plan of Care Reviewed with: patient    Subjective   "I don't want to fall."   Chief Complaint: nausea, abdominal pain  Patient/Family Comments/goals: to return to her PLOF    Occupational Profile:  Living Environment: Pt's daughter lives with her in a [facility] with 1 MONSERRAT.  Per chart, she also has pets.  She has a tub/shower combo.  Previous level of function: Independent with ADLs, IADLs, and mobility.  Pt drives.  Roles and " Routines: mother; pt works full-time as a  at the [employer]   Equipment Used at Home: other (see comments) (pt owns but does not use rollator (was her dad's))  Assistance upon Discharge: Her daughter    Pain/Comfort:  Pain Rating 1: 6/10  Location - Orientation 1: generalized  Location 1: abdomen  Pain Addressed 1: Reposition, Distraction  Pain Rating Post-Intervention 1: other (see comments) (not rated)    Patients cultural, spiritual, Zoroastrian conflicts given the current situation: no    Objective:     Communicated with: nurse and PT prior to session.  Patient found up in chair with telemetry, blood pressure cuff, pulse ox (continuous), PCA, schrader catheter, peripheral IV, arterial line upon OT entry to room.    General Precautions: Standard, fall  Orthopedic Precautions: N/A  Braces: N/A  Respiratory Status: Room air    Occupational Performance:    Bed Mobility:    Patient completed Sit to Supine from the L side of bed with moderate assistance for LE management and with writing therapist providing CGA at the trunk for safety and pain relief   Pt scooted her hips posteriorly toward center of the bed while seated EOB with CGA due to high elevation of bed     Functional Mobility/Transfers:  Pt required Mod A to initially scoot toward edge of the chair due to IV line discomfort in her L wrist and due to dizziness, which subsided  Patient completed Sit <> Stand Transfer from bedside chair x 2 trials with contact guard assistance with no assistive device and with HHA  Patient completed Chair <> Bed Transfer using Step Transfer technique with contact guard assistance with no assistive device and hand-held assist  Functional Mobility: Pt ambulated ~10 ft from chair to the sink and later ~3 ft from chair to EOB with CGA with HHA.      Activities of Daily Living:  Grooming: stand by assistance to perform oral care while standing at the sink.  She reported nausea, and emesis bag brought to pt.  Pt did  not vomit or dry heave.  Nausea subsided.  Upper Body Dressing: maximal assistance to don/doff back gown as a robe/manage lines while seated in chair and while seated EOB, respectively    Cognitive/Visual Perceptual:  Cognitive/Psychosocial Skills:     -       Oriented to: Person, Place, Time, and Situation   -       Follows Commands/attention: Follows multistep  commands  -       Communication: clear/fluent    Physical Exam:  Upper Extremity Range of Motion:     -       Right Upper Extremity: WFL  -       Left Upper Extremity: WFL  Upper Extremity Strength:    -       Right Upper Extremity: WFL  -       Left Upper Extremity: WFL    Strength:    -       Right Upper Extremity: WFL  -       Left Upper Extremity: WFL  Fine Motor Coordination:    -       Intact    AMPAC 6 Click ADL:  AMPAC Total Score: 15    Treatment & Education:  Pt edu on role of OT, POC, safety when performing self care tasks, benefit of performing OOB activity, and safety when performing functional transfers and mobility.    - Self care tasks completed-- as noted above      - Pt's vitals remained stable.     Patient left HOB elevated with all lines intact and call button in reach    GOALS:   Multidisciplinary Problems       Occupational Therapy Goals          Problem: Occupational Therapy    Goal Priority Disciplines Outcome Interventions   Occupational Therapy Goal     OT, PT/OT Progressing    Description: Goals to be met by: 12/27/2024     Patient will increase functional independence with ADLs by performing:    UE Dressing with Set-up Assistance.  LE Dressing with Supervision.  Grooming while standing at sink with Supervision.  Toileting from toilet with Supervision for hygiene and clothing management.   Supine to sit with Stand-by Assistance.  Step transfer with Supervision.  Toilet transfer to toilet with Supervision.                         History:     History reviewed. No pertinent past medical history.      Past Surgical History:    Procedure Laterality Date    CHOLECYSTECTOMY  12/12/2024    Procedure: CHOLECYSTECTOMY;  Surgeon: Ponce Rosado Jr., MD;  Location: [facility] OR [address] [place] [address];  Service: General;;    EXCISION, LIVER N/A 12/12/2024    Procedure: PARTIAL LIVER RESECTION- SEGMENT 3 AND 5 LIVER LESION;  Surgeon: Ponce Rosado Jr., MD;  Location: [facility] OR [address] [place] [address];  Service: General;  Laterality: N/A;  confirmed CUSA   Biplane probe   Valencia Nicole       Time Tracking:     OT Date of Treatment: 12/13/24  OT Start Time: 1348  OT Stop Time: 1424  OT Total Time (min): 36 min    Billable Minutes:Evaluation 10 min  Self Care/Home Management 10 min  Therapeutic Activity 16 min    12/13/2024

## 2024-12-13 NOTE — SUBJECTIVE & OBJECTIVE
Interval History: POD 1 s/p segment 3 and 5 liver resection with cholecystectomy. Pt complains of pain this AM; endorses using PCA frequently. Denies N/V. Denies flatus/BM.     Medications:  Continuous Infusions:   dextrose 5 % and 0.45 % NaCl with KCl 20 mEq   Intravenous Continuous 80 mL/hr at 12/13/24 0517 Rate Change at 12/13/24 0517    hydromorphone in 0.9 % NaCl 6 mg/30 ml   Intravenous Continuous   New Syringe/Bag at 12/12/24 1352     Scheduled Meds:   acetaminophen  1,000 mg Oral Q8H    enoxparin  40 mg Subcutaneous Q24H (prophylaxis, 1700)    levalbuterol  0.63 mg Nebulization Q6H WAKE    methocarbamol injection  500 mg Intravenous Q8H     PRN Meds:  Current Facility-Administered Medications:     0.9%  NaCl infusion (for blood administration), , Intravenous, Q24H PRN    dextrose 10%, 12.5 g, Intravenous, PRN    dextrose 10%, 25 g, Intravenous, PRN    naloxone, 0.02 mg, Intravenous, PRN    ondansetron, 4 mg, Intravenous, Q6H PRN     Review of patient's allergies indicates:  No Known Allergies  Objective:     Vital Signs (Most Recent):  Temp: 98.5 °F (36.9 °C) (12/13/24 0300)  Pulse: 69 (12/13/24 0404)  Resp: 12 (12/13/24 0300)  BP: 139/76 (12/13/24 0202)  SpO2: 100 % (12/13/24 0300) Vital Signs (24h Range):  Temp:  [98.3 °F (36.8 °C)-98.5 °F (36.9 °C)] 98.5 °F (36.9 °C)  Pulse:  [69-90] 69  Resp:  [12-26] 12  SpO2:  [94 %-100 %] 100 %  BP: (122-162)/(67-97) 139/76  Arterial Line BP: (128-185)/(60-90) 128/60     Weight: 71.2 kg (157 lb)  Body mass index is 29.66 kg/m².    Intake/Output - Last 3 Shifts         12/11 0700 12/12 0659 12/12 0700 12/13 0659    I.V. (mL/kg)  1174.5 (16.5)    IV Piggyback  2253.9    Total Intake(mL/kg)  3428.5 (48.2)    Urine (mL/kg/hr)  1505 (0.9)    Total Output  1505    Net  +1923.5                   Physical Exam  Vitals reviewed.   Constitutional:       General: She is not in acute distress.     Appearance: Normal appearance.   HENT:      Head: Normocephalic.      Nose:       Comments: NC  Eyes:      Extraocular Movements: Extraocular movements intact.   Cardiovascular:      Rate and Rhythm: Normal rate.      Pulses: Normal pulses.   Pulmonary:      Effort: Pulmonary effort is normal. No respiratory distress.      Breath sounds: No wheezing.   Abdominal:      Palpations: Abdomen is soft.      Tenderness: There is no guarding or rebound.      Comments: Appropriately TTP. Midline incision c/d/I with overlying dermabond.    Genitourinary:     Comments: schrader  Musculoskeletal:         General: Normal range of motion.      Cervical back: Normal range of motion.   Skin:     General: Skin is warm.      Capillary Refill: Capillary refill takes less than 2 seconds.   Neurological:      General: No focal deficit present.      Mental Status: She is alert and oriented to person, place, and time.   Psychiatric:         Mood and Affect: Mood normal.          Significant Labs:  I have reviewed all pertinent lab results within the past 24 hours.  CBC:   Recent Labs   Lab 12/13/24  0341   WBC 8.85   RBC 3.80*   HGB 11.4*   HCT 35.0*      MCV 92   MCH 30.0   MCHC 32.6     CMP:   Recent Labs   Lab 12/13/24  0341   *   CALCIUM 8.4*   ALBUMIN 3.1*   PROT 6.1      K 4.3   CO2 22*      BUN 11   CREATININE 0.6   ALKPHOS 142   *   *   BILITOT 0.6       Significant Diagnostics:  I have reviewed all pertinent imaging results/findings within the past 24 hours.

## 2024-12-13 NOTE — PLAN OF CARE
Problem: Occupational Therapy  Goal: Occupational Therapy Goal  Description: Goals to be met by: 12/27/2024     Patient will increase functional independence with ADLs by performing:    UE Dressing with Set-up Assistance.  LE Dressing with Supervision.  Grooming while standing at sink with Supervision.  Toileting from toilet with Supervision for hygiene and clothing management.   Supine to sit with Stand-by Assistance.  Step transfer with Supervision.  Toilet transfer to toilet with Supervision.    Outcome: Progressing     Pt evaluated and OT goals established.

## 2024-12-13 NOTE — PLAN OF CARE
[facility]  Initial Discharge Assessment       Primary Care Provider: No primary care provider on file.    Admission Diagnosis: Adenocarcinoma of colon [C18.9]  Adenocarcinoma of colon metastatic to liver [C18.9, C78.7]    Admission Date: 12/12/2024  Expected Discharge Date: 12/18/2024    Transition of Care Barriers: None    Payor: [employer] / Plan: [employer]US TIERED / Product Type: Commercial /     Extended Emergency Contact Information  Primary Emergency Contact: Shonda Vogt  Mobile Phone: [phone]  Relation: Daughter   needed? No    Discharge Plan A: Home with family  Discharge Plan B: Home, Home Health      [address] - [address] [facility]  [address] [facility] [place] [address]  Phone: [phone] Fax: [phone]      Initial Assessment (most recent)       Adult Discharge Assessment - 12/13/24 1204          Discharge Assessment    Assessment Type Discharge Planning Assessment     Confirmed/corrected address, phone number and insurance Yes     Confirmed Demographics Correct on Facesheet     Source of Information patient     Does patient/caregiver understand observation status Yes     Communicated JASON with patient/caregiver Yes     Reason For Admission s/p PARTIAL LIVER RESECTION- SEGMENT 3 AND 5 LIVER LESION (N/A)  CHOLECYSTECTOMY     People in Home child(leonardo), adult     Do you expect to return to your current living situation? Yes     Do you have help at home or someone to help you manage your care at home? Yes     Who are your caregiver(s) and their phone number(s)? Shonda Vogt (daughter)     Prior to hospitilization cognitive status: Alert/Oriented;No Deficits     Current cognitive status: Alert/Oriented;No Deficits     Walking or Climbing Stairs Difficulty no     Dressing/Bathing Difficulty no     Home Accessibility not wheelchair accessible     Home Layout Able to live on 1st floor      Equipment Currently Used at Home none     Readmission within 30 days? No     Patient currently being followed by outpatient case management? No     Do you currently have service(s) that help you manage your care at home? No     Do you take prescription medications? Yes     Do you have prescription coverage? Yes     Coverage [facility]     Do you have any problems affording any of your prescribed medications? No     Is the patient taking medications as prescribed? yes     Who is going to help you get home at discharge? daughter     How do you get to doctors appointments? car, drives self     Are you on dialysis? No     Do you take coumadin? No     Discharge Plan A Home with family     Discharge Plan B Home;Home Health     DME Needed Upon Discharge  none     Discharge Plan discussed with: Patient     Transition of Care Barriers None        Financial Resource Strain    How hard is it for you to pay for the very basics like food, housing, medical care, and heating? Not hard at all        Housing Stability    In the last 12 months, was there a time when you were not able to pay the mortgage or rent on time? No     At any time in the past 12 months, were you homeless or living in a shelter (including now)? No        Transportation Needs    Has the lack of transportation kept you from medical appointments, meetings, work or from getting things needed for daily living? No        Food Insecurity    Within the past 12 months, you worried that your food would run out before you got the money to buy more. Never true     Within the past 12 months, the food you bought just didn't last and you didn't have money to get more. Never true        Stress    Do you feel stress - tense, restless, nervous, or anxious, or unable to sleep at night because your mind is troubled all the time - these days? Not at all        Social Isolation    How often do you feel lonely or isolated from those around you?  Never        Alcohol Use     Q1: How often do you have a drink containing alcohol? Never     Q2: How many drinks containing alcohol do you have on a typical day when you are drinking? Patient does not drink     Q3: How often do you have six or more drinks on one occasion? Never        Utilities    In the past 12 months has the electric, gas, oil, or water company threatened to shut off services in your home? No        Health Literacy    How often do you need to have someone help you when you read instructions, pamphlets, or other written material from your doctor or pharmacy? Never        OTHER    Name(s) of People in Home Shonda Vogt (daughter) and grand daughter                   CM spoke with patient in Room 32605 at bedside. All information was verified on facesheet. Patient lives in a 1 story house with daughter, 1 step to get inside with pets. Patient states no assistance is needed. Patient can ambulate, drive, run errands, and complete ADL's independently. Patient does not use any DME or any in-home assistive equipment. Patient has not used Home Health previously. Patient is not on dialysis nor use Coumadin as a blood thinner. Patient takes medication as prescribed and has resources for all prescriptive needs. Patient will have help from family member upon discharge. Family will provide transportation home. All Questions and concerns addressed and whiteboard updated with CM contact information. Will continue to follow for course of hospitalization.      Discharge Plan A and Plan B have been determined by review of patient's clinical status, future medical and therapeutic needs, and coverage/benefits for post-acute care in coordination with multidisciplinary team members.     Gabriela Cuellar RN, BSN  Case Management  [phone]

## 2024-12-14 LAB
ALBUMIN SERPL BCP-MCNC: 3 G/DL (ref 3.5–5.2)
ALP SERPL-CCNC: 141 U/L (ref 40–150)
ALT SERPL W/O P-5'-P-CCNC: 183 U/L (ref 10–44)
ANION GAP SERPL CALC-SCNC: 8 MMOL/L (ref 8–16)
AST SERPL-CCNC: 172 U/L (ref 10–40)
BASOPHILS # BLD AUTO: 0.03 K/UL (ref 0–0.2)
BASOPHILS NFR BLD: 0.5 % (ref 0–1.9)
BILIRUB SERPL-MCNC: 0.5 MG/DL (ref 0.1–1)
BUN SERPL-MCNC: 8 MG/DL (ref 6–20)
CALCIUM SERPL-MCNC: 8.4 MG/DL (ref 8.7–10.5)
CHLORIDE SERPL-SCNC: 108 MMOL/L (ref 95–110)
CO2 SERPL-SCNC: 22 MMOL/L (ref 23–29)
CREAT SERPL-MCNC: 0.6 MG/DL (ref 0.5–1.4)
DIFFERENTIAL METHOD BLD: ABNORMAL
EOSINOPHIL # BLD AUTO: 0.1 K/UL (ref 0–0.5)
EOSINOPHIL NFR BLD: 0.9 % (ref 0–8)
ERYTHROCYTE [DISTWIDTH] IN BLOOD BY AUTOMATED COUNT: 13.1 % (ref 11.5–14.5)
EST. GFR  (NO RACE VARIABLE): >60 ML/MIN/1.73 M^2
GLUCOSE SERPL-MCNC: 96 MG/DL (ref 70–110)
HCT VFR BLD AUTO: 33 % (ref 37–48.5)
HGB BLD-MCNC: 10.5 G/DL (ref 12–16)
IMM GRANULOCYTES # BLD AUTO: 0.01 K/UL (ref 0–0.04)
IMM GRANULOCYTES NFR BLD AUTO: 0.2 % (ref 0–0.5)
LYMPHOCYTES # BLD AUTO: 1.1 K/UL (ref 1–4.8)
LYMPHOCYTES NFR BLD: 17.3 % (ref 18–48)
MAGNESIUM SERPL-MCNC: 1.8 MG/DL (ref 1.6–2.6)
MCH RBC QN AUTO: 30.2 PG (ref 27–31)
MCHC RBC AUTO-ENTMCNC: 31.8 G/DL (ref 32–36)
MCV RBC AUTO: 95 FL (ref 82–98)
MONOCYTES # BLD AUTO: 0.6 K/UL (ref 0.3–1)
MONOCYTES NFR BLD: 9.3 % (ref 4–15)
NEUTROPHILS # BLD AUTO: 4.6 K/UL (ref 1.8–7.7)
NEUTROPHILS NFR BLD: 71.8 % (ref 38–73)
NRBC BLD-RTO: 0 /100 WBC
PHOSPHATE SERPL-MCNC: 3.7 MG/DL (ref 2.7–4.5)
PLATELET # BLD AUTO: 188 K/UL (ref 150–450)
PMV BLD AUTO: 10.5 FL (ref 9.2–12.9)
POTASSIUM SERPL-SCNC: 3.8 MMOL/L (ref 3.5–5.1)
PROT SERPL-MCNC: 5.9 G/DL (ref 6–8.4)
RBC # BLD AUTO: 3.48 M/UL (ref 4–5.4)
SODIUM SERPL-SCNC: 138 MMOL/L (ref 136–145)
WBC # BLD AUTO: 6.42 K/UL (ref 3.9–12.7)

## 2024-12-14 PROCEDURE — 20600001 HC STEP DOWN PRIVATE ROOM

## 2024-12-14 PROCEDURE — 94640 AIRWAY INHALATION TREATMENT: CPT

## 2024-12-14 PROCEDURE — 99900035 HC TECH TIME PER 15 MIN (STAT)

## 2024-12-14 PROCEDURE — 83735 ASSAY OF MAGNESIUM: CPT

## 2024-12-14 PROCEDURE — 25000003 PHARM REV CODE 250: Performed by: STUDENT IN AN ORGANIZED HEALTH CARE EDUCATION/TRAINING PROGRAM

## 2024-12-14 PROCEDURE — 25000242 PHARM REV CODE 250 ALT 637 W/ HCPCS: Performed by: NURSE PRACTITIONER

## 2024-12-14 PROCEDURE — 25000003 PHARM REV CODE 250

## 2024-12-14 PROCEDURE — 84100 ASSAY OF PHOSPHORUS: CPT

## 2024-12-14 PROCEDURE — 94761 N-INVAS EAR/PLS OXIMETRY MLT: CPT

## 2024-12-14 PROCEDURE — 94799 UNLISTED PULMONARY SVC/PX: CPT

## 2024-12-14 PROCEDURE — 80053 COMPREHEN METABOLIC PANEL: CPT

## 2024-12-14 PROCEDURE — 85025 COMPLETE CBC W/AUTO DIFF WBC: CPT

## 2024-12-14 PROCEDURE — 63600175 PHARM REV CODE 636 W HCPCS

## 2024-12-14 PROCEDURE — 63600175 PHARM REV CODE 636 W HCPCS: Performed by: STUDENT IN AN ORGANIZED HEALTH CARE EDUCATION/TRAINING PROGRAM

## 2024-12-14 PROCEDURE — 94799 UNLISTED PULMONARY SVC/PX: CPT | Mod: XB

## 2024-12-14 PROCEDURE — 94664 DEMO&/EVAL PT USE INHALER: CPT

## 2024-12-14 RX ORDER — OXYCODONE HYDROCHLORIDE 5 MG/1
5 TABLET ORAL EVERY 4 HOURS PRN
Status: DISCONTINUED | OUTPATIENT
Start: 2024-12-14 | End: 2024-12-16 | Stop reason: HOSPADM

## 2024-12-14 RX ORDER — METHOCARBAMOL 500 MG/1
500 TABLET, FILM COATED ORAL 4 TIMES DAILY
Status: DISCONTINUED | OUTPATIENT
Start: 2024-12-14 | End: 2024-12-16 | Stop reason: HOSPADM

## 2024-12-14 RX ORDER — MUPIROCIN 20 MG/G
OINTMENT TOPICAL 2 TIMES DAILY
Status: DISCONTINUED | OUTPATIENT
Start: 2024-12-14 | End: 2024-12-16 | Stop reason: HOSPADM

## 2024-12-14 RX ORDER — OXYCODONE HYDROCHLORIDE 10 MG/1
10 TABLET ORAL EVERY 4 HOURS PRN
Status: DISCONTINUED | OUTPATIENT
Start: 2024-12-14 | End: 2024-12-16 | Stop reason: HOSPADM

## 2024-12-14 RX ORDER — HYDROMORPHONE HYDROCHLORIDE 1 MG/ML
0.5 INJECTION, SOLUTION INTRAMUSCULAR; INTRAVENOUS; SUBCUTANEOUS EVERY 4 HOURS PRN
Status: DISCONTINUED | OUTPATIENT
Start: 2024-12-14 | End: 2024-12-16

## 2024-12-14 RX ADMIN — OXYCODONE HYDROCHLORIDE 10 MG: 10 TABLET ORAL at 11:12

## 2024-12-14 RX ADMIN — MUPIROCIN: 20 OINTMENT TOPICAL at 08:12

## 2024-12-14 RX ADMIN — ENOXAPARIN SODIUM 40 MG: 40 INJECTION SUBCUTANEOUS at 05:12

## 2024-12-14 RX ADMIN — POTASSIUM CHLORIDE: 2 INJECTION, SOLUTION, CONCENTRATE INTRAVENOUS at 12:12

## 2024-12-14 RX ADMIN — METHOCARBAMOL 500 MG: 500 TABLET ORAL at 08:12

## 2024-12-14 RX ADMIN — ACETAMINOPHEN 1000 MG: 500 TABLET ORAL at 06:12

## 2024-12-14 RX ADMIN — KETOROLAC TROMETHAMINE 10 MG: 10 TABLET, FILM COATED ORAL at 06:12

## 2024-12-14 RX ADMIN — KETOROLAC TROMETHAMINE 10 MG: 10 TABLET, FILM COATED ORAL at 12:12

## 2024-12-14 RX ADMIN — SODIUM PHOSPHATE, MONOBASIC, MONOHYDRATE AND SODIUM PHOSPHATE, DIBASIC, ANHYDROUS 20.01 MMOL: 142; 276 INJECTION, SOLUTION INTRAVENOUS at 06:12

## 2024-12-14 RX ADMIN — LEVALBUTEROL HYDROCHLORIDE 0.63 MG: 0.63 SOLUTION RESPIRATORY (INHALATION) at 08:12

## 2024-12-14 RX ADMIN — KETOROLAC TROMETHAMINE 10 MG: 10 TABLET, FILM COATED ORAL at 11:12

## 2024-12-14 RX ADMIN — OXYCODONE 5 MG: 5 TABLET ORAL at 08:12

## 2024-12-14 RX ADMIN — METHOCARBAMOL 500 MG: 500 TABLET ORAL at 01:12

## 2024-12-14 RX ADMIN — METHOCARBAMOL 500 MG: 100 INJECTION INTRAMUSCULAR; INTRAVENOUS at 06:12

## 2024-12-14 RX ADMIN — METHOCARBAMOL 500 MG: 500 TABLET ORAL at 05:12

## 2024-12-14 RX ADMIN — ACETAMINOPHEN 1000 MG: 500 TABLET ORAL at 01:12

## 2024-12-14 RX ADMIN — LEVALBUTEROL HYDROCHLORIDE 0.63 MG: 0.63 SOLUTION RESPIRATORY (INHALATION) at 02:12

## 2024-12-14 RX ADMIN — KETOROLAC TROMETHAMINE 10 MG: 10 TABLET, FILM COATED ORAL at 05:12

## 2024-12-14 NOTE — ASSESSMENT & PLAN NOTE
Oumou Leon is a 54 y.o. F w/ PMHx including colorectal liver metastasis . Now s/p segment 3 and 5 liver resection with cholecystectomy with Dr. Rosado on 12/13/24    - Diet: advance diet to regular diet   - Pain: dc PCA today; multimodals  - Antibiotics: N/A  - Home Meds: will restart as appropriate  - DVT Prophylaxis: lovenox  - Labs: Daily CBC, CMP, Mg, Ph - replete lytes prn  - PRN nausea control  - Endo: sliding scale insulin  - Aggressive pulmonary toilet: IS, acapella, Xopenex nebs  - OOB/ambulate, PT/OT    Dispo: Continue inpatient care.

## 2024-12-14 NOTE — PROGRESS NOTES
[facility]  General Surgery  Progress Note    Subjective:     History of Present Illness:  No notes on file    Post-Op Info:  Procedure(s) (LRB):  PARTIAL LIVER RESECTION- SEGMENT 3 AND 5 LIVER LESION (N/A)  CHOLECYSTECTOMY   2 Days Post-Op     Interval History: Doing well overnight. Up in chair this morning. No major complaints. Tolerating diet denies any nausea or vomiting.      Medications:  Continuous Infusions:  Scheduled Meds:   acetaminophen  1,000 mg Oral Q8H    enoxparin  40 mg Subcutaneous Q24H (prophylaxis, 1700)    ketorolac  10 mg Oral Q6H    levalbuterol  0.63 mg Nebulization Q6H WAKE    methocarbamoL  500 mg Oral QID    mupirocin   Nasal BID    sodium phosphate 20.01 mmol in D5W 250 mL IVPB  20.01 mmol Intravenous Once     PRN Meds:  Current Facility-Administered Medications:     0.9%  NaCl infusion (for blood administration), , Intravenous, Q24H PRN    dextrose 10%, 12.5 g, Intravenous, PRN    dextrose 10%, 25 g, Intravenous, PRN    HYDROmorphone, 0.5 mg, Intravenous, Q4H PRN    ondansetron, 4 mg, Intravenous, Q6H PRN    oxyCODONE, 5 mg, Oral, Q4H PRN    oxyCODONE, 10 mg, Oral, Q4H PRN     Review of patient's allergies indicates:  No Known Allergies  Objective:     Vital Signs (Most Recent):  Temp: 98.2 °F (36.8 °C) (12/14/24 0324)  Pulse: 83 (12/14/24 0644)  Resp: 20 (12/14/24 0640)  BP: (!) 158/84 (12/14/24 0603)  SpO2: 97 % (12/14/24 0640) Vital Signs (24h Range):  Temp:  [97.9 °F (36.6 °C)-98.5 °F (36.9 °C)] 98.2 °F (36.8 °C)  Pulse:  [66-85] 83  Resp:  [10-21] 20  SpO2:  [95 %-100 %] 97 %  BP: (110-158)/(58-84) 158/84  Arterial Line BP: (116-123)/(51-54) 116/51     Weight: 71.2 kg (157 lb)  Body mass index is 29.66 kg/m².    Intake/Output - Last 3 Shifts         12/12 0700 12/13 0659 12/13 0700 12/14 0659 12/14 0700  12/15 0659    P.O.  480     I.V. (mL/kg) 1174.5 (16.5) 2285.5 (32.1)     IV Piggyback 2253.9 249.8     Total Intake(mL/kg) 3428.5 (48.2) 3015.3 (42.3)      Urine (mL/kg/hr) 2005 (1.2) 1625 (1)     Stool  0     Total Output 2005 1625     Net +1423.5 +1390.3            Stool Occurrence  0 x              Physical Exam  Vitals reviewed.   Constitutional:       General: She is not in acute distress.     Appearance: Normal appearance.   HENT:      Head: Normocephalic.      Nose:      Comments: NC  Eyes:      Extraocular Movements: Extraocular movements intact.   Cardiovascular:      Rate and Rhythm: Normal rate.      Pulses: Normal pulses.   Pulmonary:      Effort: Pulmonary effort is normal. No respiratory distress.      Breath sounds: No wheezing.   Abdominal:      Palpations: Abdomen is soft.      Tenderness: There is no guarding or rebound.      Comments: Appropriately TTP. Midline incision c/d/I with overlying dermabond.    Musculoskeletal:         General: Normal range of motion.      Cervical back: Normal range of motion.   Skin:     General: Skin is warm.      Capillary Refill: Capillary refill takes less than 2 seconds.   Neurological:      General: No focal deficit present.      Mental Status: She is alert and oriented to person, place, and time.   Psychiatric:         Mood and Affect: Mood normal.          Significant Labs:  I have reviewed all pertinent lab results within the past 24 hours.    Significant Diagnostics:  I have reviewed all pertinent imaging results/findings within the past 24 hours.  Assessment/Plan:     Adenocarcinoma of colon  Oumou Leon is a 54 y.o. F w/ PMHx including colorectal liver metastasis . Now s/p segment 3 and 5 liver resection with cholecystectomy with Dr. Rosado on 12/13/24    - Diet: advance diet to regular diet   - Pain: dc PCA today; multimodals  - Antibiotics: N/A  - Home Meds: will restart as appropriate  - DVT Prophylaxis: lovenox  - Labs: Daily CBC, CMP, Mg, Ph - replete lytes prn  - PRN nausea control  - Endo: sliding scale insulin  - Aggressive pulmonary toilet: IS, acapella, Xopenex nebs  - OOB/ambulate,  PT/OT    Dispo: Continue inpatient care.             Carlota Gould MD  General Surgery  [facility] - Surgical Intensive Care

## 2024-12-14 NOTE — SUBJECTIVE & OBJECTIVE
Interval History: Doing well overnight. Up in chair this morning. No major complaints. Tolerating diet denies any nausea or vomiting.      Medications:  Continuous Infusions:  Scheduled Meds:   acetaminophen  1,000 mg Oral Q8H    enoxparin  40 mg Subcutaneous Q24H (prophylaxis, 1700)    ketorolac  10 mg Oral Q6H    levalbuterol  0.63 mg Nebulization Q6H WAKE    methocarbamoL  500 mg Oral QID    mupirocin   Nasal BID    sodium phosphate 20.01 mmol in D5W 250 mL IVPB  20.01 mmol Intravenous Once     PRN Meds:  Current Facility-Administered Medications:     0.9%  NaCl infusion (for blood administration), , Intravenous, Q24H PRN    dextrose 10%, 12.5 g, Intravenous, PRN    dextrose 10%, 25 g, Intravenous, PRN    HYDROmorphone, 0.5 mg, Intravenous, Q4H PRN    ondansetron, 4 mg, Intravenous, Q6H PRN    oxyCODONE, 5 mg, Oral, Q4H PRN    oxyCODONE, 10 mg, Oral, Q4H PRN     Review of patient's allergies indicates:  No Known Allergies  Objective:     Vital Signs (Most Recent):  Temp: 98.2 °F (36.8 °C) (12/14/24 0324)  Pulse: 83 (12/14/24 0644)  Resp: 20 (12/14/24 0640)  BP: (!) 158/84 (12/14/24 0603)  SpO2: 97 % (12/14/24 0640) Vital Signs (24h Range):  Temp:  [97.9 °F (36.6 °C)-98.5 °F (36.9 °C)] 98.2 °F (36.8 °C)  Pulse:  [66-85] 83  Resp:  [10-21] 20  SpO2:  [95 %-100 %] 97 %  BP: (110-158)/(58-84) 158/84  Arterial Line BP: (116-123)/(51-54) 116/51     Weight: 71.2 kg (157 lb)  Body mass index is 29.66 kg/m².    Intake/Output - Last 3 Shifts         12/12 0700  12/13 0659 12/13 0700 12/14 0659 12/14 0700  12/15 0659    P.O.  480     I.V. (mL/kg) 1174.5 (16.5) 2285.5 (32.1)     IV Piggyback 2253.9 249.8     Total Intake(mL/kg) 3428.5 (48.2) 3015.3 (42.3)     Urine (mL/kg/hr) 2005 (1.2) 1625 (1)     Stool  0     Total Output 2005 1625     Net +1423.5 +1390.3            Stool Occurrence  0 x              Physical Exam  Vitals reviewed.   Constitutional:       General: She is not in acute distress.     Appearance: Normal  appearance.   HENT:      Head: Normocephalic.      Nose:      Comments: NC  Eyes:      Extraocular Movements: Extraocular movements intact.   Cardiovascular:      Rate and Rhythm: Normal rate.      Pulses: Normal pulses.   Pulmonary:      Effort: Pulmonary effort is normal. No respiratory distress.      Breath sounds: No wheezing.   Abdominal:      Palpations: Abdomen is soft.      Tenderness: There is no guarding or rebound.      Comments: Appropriately TTP. Midline incision c/d/I with overlying dermabond.    Musculoskeletal:         General: Normal range of motion.      Cervical back: Normal range of motion.   Skin:     General: Skin is warm.      Capillary Refill: Capillary refill takes less than 2 seconds.   Neurological:      General: No focal deficit present.      Mental Status: She is alert and oriented to person, place, and time.   Psychiatric:         Mood and Affect: Mood normal.          Significant Labs:  I have reviewed all pertinent lab results within the past 24 hours.    Significant Diagnostics:  I have reviewed all pertinent imaging results/findings within the past 24 hours.

## 2024-12-15 LAB
ALBUMIN SERPL BCP-MCNC: 3 G/DL (ref 3.5–5.2)
ALBUMIN SERPL BCP-MCNC: 3.1 G/DL (ref 3.5–5.2)
ALP SERPL-CCNC: 510 U/L (ref 40–150)
ALP SERPL-CCNC: 552 U/L (ref 40–150)
ALT SERPL W/O P-5'-P-CCNC: 274 U/L (ref 10–44)
ALT SERPL W/O P-5'-P-CCNC: 336 U/L (ref 10–44)
ANION GAP SERPL CALC-SCNC: 11 MMOL/L (ref 8–16)
ANION GAP SERPL CALC-SCNC: 9 MMOL/L (ref 8–16)
AST SERPL-CCNC: 445 U/L (ref 10–40)
AST SERPL-CCNC: 509 U/L (ref 10–40)
BASOPHILS # BLD AUTO: 0.02 K/UL (ref 0–0.2)
BASOPHILS NFR BLD: 0.4 % (ref 0–1.9)
BILIRUB SERPL-MCNC: 1 MG/DL (ref 0.1–1)
BILIRUB SERPL-MCNC: 1.4 MG/DL (ref 0.1–1)
BUN SERPL-MCNC: 8 MG/DL (ref 6–20)
BUN SERPL-MCNC: 8 MG/DL (ref 6–20)
CALCIUM SERPL-MCNC: 8.8 MG/DL (ref 8.7–10.5)
CALCIUM SERPL-MCNC: 9.1 MG/DL (ref 8.7–10.5)
CHLORIDE SERPL-SCNC: 104 MMOL/L (ref 95–110)
CHLORIDE SERPL-SCNC: 106 MMOL/L (ref 95–110)
CO2 SERPL-SCNC: 23 MMOL/L (ref 23–29)
CO2 SERPL-SCNC: 24 MMOL/L (ref 23–29)
CREAT SERPL-MCNC: 0.6 MG/DL (ref 0.5–1.4)
CREAT SERPL-MCNC: 0.6 MG/DL (ref 0.5–1.4)
DIFFERENTIAL METHOD BLD: ABNORMAL
EOSINOPHIL # BLD AUTO: 0.1 K/UL (ref 0–0.5)
EOSINOPHIL NFR BLD: 1.4 % (ref 0–8)
ERYTHROCYTE [DISTWIDTH] IN BLOOD BY AUTOMATED COUNT: 12.5 % (ref 11.5–14.5)
EST. GFR  (NO RACE VARIABLE): >60 ML/MIN/1.73 M^2
EST. GFR  (NO RACE VARIABLE): >60 ML/MIN/1.73 M^2
GLUCOSE SERPL-MCNC: 88 MG/DL (ref 70–110)
GLUCOSE SERPL-MCNC: 89 MG/DL (ref 70–110)
HCT VFR BLD AUTO: 32.4 % (ref 37–48.5)
HGB BLD-MCNC: 10.7 G/DL (ref 12–16)
IMM GRANULOCYTES # BLD AUTO: 0.01 K/UL (ref 0–0.04)
IMM GRANULOCYTES NFR BLD AUTO: 0.2 % (ref 0–0.5)
LYMPHOCYTES # BLD AUTO: 1.1 K/UL (ref 1–4.8)
LYMPHOCYTES NFR BLD: 23 % (ref 18–48)
MAGNESIUM SERPL-MCNC: 1.7 MG/DL (ref 1.6–2.6)
MCH RBC QN AUTO: 30.1 PG (ref 27–31)
MCHC RBC AUTO-ENTMCNC: 33 G/DL (ref 32–36)
MCV RBC AUTO: 91 FL (ref 82–98)
MONOCYTES # BLD AUTO: 0.6 K/UL (ref 0.3–1)
MONOCYTES NFR BLD: 13 % (ref 4–15)
NEUTROPHILS # BLD AUTO: 3 K/UL (ref 1.8–7.7)
NEUTROPHILS NFR BLD: 62 % (ref 38–73)
NRBC BLD-RTO: 0 /100 WBC
PHOSPHATE SERPL-MCNC: 3.7 MG/DL (ref 2.7–4.5)
PLATELET # BLD AUTO: 193 K/UL (ref 150–450)
PMV BLD AUTO: 10.4 FL (ref 9.2–12.9)
POTASSIUM SERPL-SCNC: 3.9 MMOL/L (ref 3.5–5.1)
POTASSIUM SERPL-SCNC: 4.1 MMOL/L (ref 3.5–5.1)
PROT SERPL-MCNC: 6.2 G/DL (ref 6–8.4)
PROT SERPL-MCNC: 6.5 G/DL (ref 6–8.4)
RBC # BLD AUTO: 3.55 M/UL (ref 4–5.4)
SODIUM SERPL-SCNC: 138 MMOL/L (ref 136–145)
SODIUM SERPL-SCNC: 139 MMOL/L (ref 136–145)
WBC # BLD AUTO: 4.91 K/UL (ref 3.9–12.7)

## 2024-12-15 PROCEDURE — 80053 COMPREHEN METABOLIC PANEL: CPT | Mod: 91

## 2024-12-15 PROCEDURE — 25000003 PHARM REV CODE 250

## 2024-12-15 PROCEDURE — 25000003 PHARM REV CODE 250: Performed by: STUDENT IN AN ORGANIZED HEALTH CARE EDUCATION/TRAINING PROGRAM

## 2024-12-15 PROCEDURE — 84100 ASSAY OF PHOSPHORUS: CPT

## 2024-12-15 PROCEDURE — 36415 COLL VENOUS BLD VENIPUNCTURE: CPT | Performed by: STUDENT IN AN ORGANIZED HEALTH CARE EDUCATION/TRAINING PROGRAM

## 2024-12-15 PROCEDURE — 83735 ASSAY OF MAGNESIUM: CPT

## 2024-12-15 PROCEDURE — 63600175 PHARM REV CODE 636 W HCPCS

## 2024-12-15 PROCEDURE — 20600001 HC STEP DOWN PRIVATE ROOM

## 2024-12-15 PROCEDURE — 85025 COMPLETE CBC W/AUTO DIFF WBC: CPT

## 2024-12-15 PROCEDURE — 80053 COMPREHEN METABOLIC PANEL: CPT | Performed by: STUDENT IN AN ORGANIZED HEALTH CARE EDUCATION/TRAINING PROGRAM

## 2024-12-15 RX ADMIN — MUPIROCIN: 20 OINTMENT TOPICAL at 08:12

## 2024-12-15 RX ADMIN — METHOCARBAMOL 500 MG: 500 TABLET ORAL at 06:12

## 2024-12-15 RX ADMIN — METHOCARBAMOL 500 MG: 500 TABLET ORAL at 12:12

## 2024-12-15 RX ADMIN — ACETAMINOPHEN 1000 MG: 500 TABLET ORAL at 02:12

## 2024-12-15 RX ADMIN — KETOROLAC TROMETHAMINE 10 MG: 10 TABLET, FILM COATED ORAL at 12:12

## 2024-12-15 RX ADMIN — ACETAMINOPHEN 1000 MG: 500 TABLET ORAL at 08:12

## 2024-12-15 RX ADMIN — METHOCARBAMOL 500 MG: 500 TABLET ORAL at 08:12

## 2024-12-15 RX ADMIN — KETOROLAC TROMETHAMINE 10 MG: 10 TABLET, FILM COATED ORAL at 05:12

## 2024-12-15 RX ADMIN — KETOROLAC TROMETHAMINE 10 MG: 10 TABLET, FILM COATED ORAL at 06:12

## 2024-12-15 RX ADMIN — ACETAMINOPHEN 1000 MG: 500 TABLET ORAL at 05:12

## 2024-12-15 RX ADMIN — ENOXAPARIN SODIUM 40 MG: 40 INJECTION SUBCUTANEOUS at 06:12

## 2024-12-15 RX ADMIN — OXYCODONE HYDROCHLORIDE 10 MG: 10 TABLET ORAL at 11:12

## 2024-12-15 NOTE — ASSESSMENT & PLAN NOTE
Oumou Leon is a 54 y.o. F w/ PMHx including colorectal liver metastasis . Now s/p segment 3 and 5 liver resection with cholecystectomy with Dr. Rosado on 12/13/24    - Will see what CMP is this am. US normal 12/15 completed for elevated liver studies.   - Diet: regular diet. Hopefully eat more today.   - Pain: PO multimodals  - Antibiotics: N/A  - Home Meds: ordered as appropriate   - DVT Prophylaxis: lovenox  - Labs: Daily CBC, CMP, Mg, Ph - replete lytes prn  - PRN nausea control  - Endo: sliding scale insulin  - Aggressive pulmonary toilet: IS, acapella, Xopenex nebs  - OOB/ambulate, PT/OT    Dispo: Continue inpatient care; possible d/c PM or tomorrow pending labs and diet tolerance

## 2024-12-15 NOTE — ASSESSMENT & PLAN NOTE
Oumou Leon is a 54 y.o. F w/ PMHx including colorectal liver metastasis . Now s/p segment 3 and 5 liver resection with cholecystectomy with Dr. Rosado on 12/13/24    - Diet: regular diet   - Pain: PO multimodals  - Antibiotics: N/A  - Home Meds: will restart as appropriate  - DVT Prophylaxis: lovenox  - Labs: Daily CBC, CMP, Mg, Ph - replete lytes prn  - PRN nausea control  - Endo: sliding scale insulin  - Aggressive pulmonary toilet: IS, acapella, Xopenex nebs  - OOB/ambulate, PT/OT    Dispo: Continue inpatient care; likely DC tmrw.

## 2024-12-15 NOTE — SUBJECTIVE & OBJECTIVE
Interval History: No major changes overnight. Doing well overnight. Tolerated her diet but has little appetite. Pain controlled on PO meds. Ambulating. No BM but passing gas.      Medications:  Continuous Infusions:  Scheduled Meds:   acetaminophen  1,000 mg Oral Q8H    enoxparin  40 mg Subcutaneous Q24H (prophylaxis, 1700)    ketorolac  10 mg Oral Q6H    methocarbamoL  500 mg Oral QID    mupirocin   Nasal BID     PRN Meds:  Current Facility-Administered Medications:     0.9%  NaCl infusion (for blood administration), , Intravenous, Q24H PRN    dextrose 10%, 12.5 g, Intravenous, PRN    dextrose 10%, 25 g, Intravenous, PRN    HYDROmorphone, 0.5 mg, Intravenous, Q4H PRN    ondansetron, 4 mg, Intravenous, Q6H PRN    oxyCODONE, 5 mg, Oral, Q4H PRN    oxyCODONE, 10 mg, Oral, Q4H PRN     Review of patient's allergies indicates:  No Known Allergies  Objective:     Vital Signs (Most Recent):  Temp: 98.2 °F (36.8 °C) (12/15/24 0415)  Pulse: 83 (12/15/24 0727)  Resp: 17 (12/15/24 0727)  BP: (!) 150/89 (12/15/24 0723)  SpO2: 97 % (12/15/24 0727) Vital Signs (24h Range):  Temp:  [98.1 °F (36.7 °C)-98.5 °F (36.9 °C)] 98.2 °F (36.8 °C)  Pulse:  [75-99] 83  Resp:  [15-25] 17  SpO2:  [94 %-99 %] 97 %  BP: (122-169)/(71-89) 150/89     Weight: 71.2 kg (157 lb)  Body mass index is 29.66 kg/m².    Intake/Output - Last 3 Shifts         12/13 0700  12/14 0659 12/14 0700  12/15 0659 12/15 0700  12/16 0659    P.O. 480 240     I.V. (mL/kg) 2285.5 (32.1)      IV Piggyback 249.8      Total Intake(mL/kg) 3015.3 (42.3) 240 (3.4)     Urine (mL/kg/hr) 1625 (1) 2375 (1.4)     Stool 0 0     Total Output 1625 2375     Net +1390.3 -2135            Stool Occurrence 0 x 0 x              Physical Exam  Vitals reviewed.   Constitutional:       General: She is not in acute distress.     Appearance: Normal appearance.   HENT:      Head: Normocephalic.   Eyes:      Extraocular Movements: Extraocular movements intact.   Cardiovascular:      Rate and Rhythm:  Normal rate.      Pulses: Normal pulses.   Pulmonary:      Effort: Pulmonary effort is normal. No respiratory distress.      Breath sounds: No wheezing.   Abdominal:      Palpations: Abdomen is soft.      Tenderness: There is no guarding or rebound.      Comments: Appropriately TTP. Midline incision c/d/I with overlying dermabond.    Musculoskeletal:         General: Normal range of motion.      Cervical back: Normal range of motion.   Skin:     General: Skin is warm.      Capillary Refill: Capillary refill takes less than 2 seconds.   Neurological:      General: No focal deficit present.      Mental Status: She is alert and oriented to person, place, and time.   Psychiatric:         Mood and Affect: Mood normal.          Significant Labs:  I have reviewed all pertinent lab results within the past 24 hours.    Significant Diagnostics:  I have reviewed all pertinent imaging results/findings within the past 24 hours.

## 2024-12-15 NOTE — PROGRESS NOTES
[facility]  General Surgery  Progress Note    Subjective:     History of Present Illness:  No notes on file    Post-Op Info:  Procedure(s) (LRB):  PARTIAL LIVER RESECTION- SEGMENT 3 AND 5 LIVER LESION (N/A)  CHOLECYSTECTOMY   3 Days Post-Op     Interval History: No major changes overnight. Doing well overnight. Tolerated her diet but has little appetite. Pain controlled on PO meds. Ambulating. No BM but passing gas.      Medications:  Continuous Infusions:  Scheduled Meds:   acetaminophen  1,000 mg Oral Q8H    enoxparin  40 mg Subcutaneous Q24H (prophylaxis, 1700)    ketorolac  10 mg Oral Q6H    methocarbamoL  500 mg Oral QID    mupirocin   Nasal BID     PRN Meds:  Current Facility-Administered Medications:     0.9%  NaCl infusion (for blood administration), , Intravenous, Q24H PRN    dextrose 10%, 12.5 g, Intravenous, PRN    dextrose 10%, 25 g, Intravenous, PRN    HYDROmorphone, 0.5 mg, Intravenous, Q4H PRN    ondansetron, 4 mg, Intravenous, Q6H PRN    oxyCODONE, 5 mg, Oral, Q4H PRN    oxyCODONE, 10 mg, Oral, Q4H PRN     Review of patient's allergies indicates:  No Known Allergies  Objective:     Vital Signs (Most Recent):  Temp: 98.2 °F (36.8 °C) (12/15/24 0415)  Pulse: 83 (12/15/24 0727)  Resp: 17 (12/15/24 0727)  BP: (!) 150/89 (12/15/24 0723)  SpO2: 97 % (12/15/24 0727) Vital Signs (24h Range):  Temp:  [98.1 °F (36.7 °C)-98.5 °F (36.9 °C)] 98.2 °F (36.8 °C)  Pulse:  [75-99] 83  Resp:  [15-25] 17  SpO2:  [94 %-99 %] 97 %  BP: (122-169)/(71-89) 150/89     Weight: 71.2 kg (157 lb)  Body mass index is 29.66 kg/m².    Intake/Output - Last 3 Shifts         12/13 0700  12/14 0659 12/14 0700  12/15 0659 12/15 0700  12/16 0659    P.O. 480 240     I.V. (mL/kg) 2285.5 (32.1)      IV Piggyback 249.8      Total Intake(mL/kg) 3015.3 (42.3) 240 (3.4)     Urine (mL/kg/hr) 1625 (1) 2375 (1.4)     Stool 0 0     Total Output 1625 2375     Net +1390.3 -2135            Stool Occurrence 0 x 0 x               Physical Exam  Vitals reviewed.   Constitutional:       General: She is not in acute distress.     Appearance: Normal appearance.   HENT:      Head: Normocephalic.   Eyes:      Extraocular Movements: Extraocular movements intact.   Cardiovascular:      Rate and Rhythm: Normal rate.      Pulses: Normal pulses.   Pulmonary:      Effort: Pulmonary effort is normal. No respiratory distress.      Breath sounds: No wheezing.   Abdominal:      Palpations: Abdomen is soft.      Tenderness: There is no guarding or rebound.      Comments: Appropriately TTP. Midline incision c/d/I with overlying dermabond.    Musculoskeletal:         General: Normal range of motion.      Cervical back: Normal range of motion.   Skin:     General: Skin is warm.      Capillary Refill: Capillary refill takes less than 2 seconds.   Neurological:      General: No focal deficit present.      Mental Status: She is alert and oriented to person, place, and time.   Psychiatric:         Mood and Affect: Mood normal.          Significant Labs:  I have reviewed all pertinent lab results within the past 24 hours.    Significant Diagnostics:  I have reviewed all pertinent imaging results/findings within the past 24 hours.  Assessment/Plan:     Adenocarcinoma of colon  Oumou Leon is a 54 y.o. F w/ PMHx including colorectal liver metastasis . Now s/p segment 3 and 5 liver resection with cholecystectomy with Dr. Rosado on 12/13/24    - Tbili 1.4 this morning with worsening transaminitis; will obtain a RUQ ultrasound today  - Diet: regular diet   - Pain: PO multimodals  - Antibiotics: N/A  - Home Meds: will restart as appropriate  - DVT Prophylaxis: lovenox  - Labs: Daily CBC, CMP, Mg, Ph - replete lytes prn  - PRN nausea control  - Endo: sliding scale insulin  - Aggressive pulmonary toilet: IS, acapella, Xopenex nebs  - OOB/ambulate, PT/OT    Dispo: Continue inpatient care; likely DC tmrw.             Carlota Gould MD  General Surgery  [facility] -  Surgical Intensive Care

## 2024-12-16 VITALS
SYSTOLIC BLOOD PRESSURE: 142 MMHG | OXYGEN SATURATION: 97 % | DIASTOLIC BLOOD PRESSURE: 84 MMHG | TEMPERATURE: 98 F | RESPIRATION RATE: 14 BRPM | WEIGHT: 157 LBS | HEIGHT: 61 IN | BODY MASS INDEX: 29.64 KG/M2 | HEART RATE: 94 BPM

## 2024-12-16 LAB
ALBUMIN SERPL BCP-MCNC: 3.2 G/DL (ref 3.5–5.2)
ALP SERPL-CCNC: 648 U/L (ref 40–150)
ALT SERPL W/O P-5'-P-CCNC: 367 U/L (ref 10–44)
ANION GAP SERPL CALC-SCNC: 15 MMOL/L (ref 8–16)
AST SERPL-CCNC: 369 U/L (ref 10–40)
BASOPHILS # BLD AUTO: 0.02 K/UL (ref 0–0.2)
BASOPHILS NFR BLD: 0.3 % (ref 0–1.9)
BILIRUB SERPL-MCNC: 1.1 MG/DL (ref 0.1–1)
BLD PROD TYP BPU: NORMAL
BLD PROD TYP BPU: NORMAL
BLOOD UNIT EXPIRATION DATE: NORMAL
BLOOD UNIT EXPIRATION DATE: NORMAL
BLOOD UNIT TYPE CODE: 6200
BLOOD UNIT TYPE CODE: 6200
BLOOD UNIT TYPE: NORMAL
BLOOD UNIT TYPE: NORMAL
BUN SERPL-MCNC: 11 MG/DL (ref 6–20)
CALCIUM SERPL-MCNC: 9.1 MG/DL (ref 8.7–10.5)
CHLORIDE SERPL-SCNC: 103 MMOL/L (ref 95–110)
CO2 SERPL-SCNC: 19 MMOL/L (ref 23–29)
CODING SYSTEM: NORMAL
CODING SYSTEM: NORMAL
CREAT SERPL-MCNC: 0.6 MG/DL (ref 0.5–1.4)
CROSSMATCH INTERPRETATION: NORMAL
CROSSMATCH INTERPRETATION: NORMAL
DIFFERENTIAL METHOD BLD: ABNORMAL
DISPENSE STATUS: NORMAL
DISPENSE STATUS: NORMAL
EOSINOPHIL # BLD AUTO: 0.1 K/UL (ref 0–0.5)
EOSINOPHIL NFR BLD: 0.8 % (ref 0–8)
ERYTHROCYTE [DISTWIDTH] IN BLOOD BY AUTOMATED COUNT: 12.3 % (ref 11.5–14.5)
EST. GFR  (NO RACE VARIABLE): >60 ML/MIN/1.73 M^2
GLUCOSE SERPL-MCNC: 69 MG/DL (ref 70–110)
HCT VFR BLD AUTO: 35.4 % (ref 37–48.5)
HGB BLD-MCNC: 11.6 G/DL (ref 12–16)
IMM GRANULOCYTES # BLD AUTO: 0.01 K/UL (ref 0–0.04)
IMM GRANULOCYTES NFR BLD AUTO: 0.2 % (ref 0–0.5)
LYMPHOCYTES # BLD AUTO: 0.7 K/UL (ref 1–4.8)
LYMPHOCYTES NFR BLD: 11.9 % (ref 18–48)
MAGNESIUM SERPL-MCNC: 1.8 MG/DL (ref 1.6–2.6)
MCH RBC QN AUTO: 29.9 PG (ref 27–31)
MCHC RBC AUTO-ENTMCNC: 32.8 G/DL (ref 32–36)
MCV RBC AUTO: 91 FL (ref 82–98)
MONOCYTES # BLD AUTO: 0.4 K/UL (ref 0.3–1)
MONOCYTES NFR BLD: 7.1 % (ref 4–15)
NEUTROPHILS # BLD AUTO: 4.8 K/UL (ref 1.8–7.7)
NEUTROPHILS NFR BLD: 79.7 % (ref 38–73)
NRBC BLD-RTO: 0 /100 WBC
NUM UNITS TRANS PACKED RBC: NORMAL
NUM UNITS TRANS PACKED RBC: NORMAL
PHOSPHATE SERPL-MCNC: 4 MG/DL (ref 2.7–4.5)
PLATELET # BLD AUTO: 228 K/UL (ref 150–450)
PMV BLD AUTO: 10.2 FL (ref 9.2–12.9)
POTASSIUM SERPL-SCNC: 3.9 MMOL/L (ref 3.5–5.1)
PROT SERPL-MCNC: 6.7 G/DL (ref 6–8.4)
RBC # BLD AUTO: 3.88 M/UL (ref 4–5.4)
SODIUM SERPL-SCNC: 137 MMOL/L (ref 136–145)
WBC # BLD AUTO: 6.03 K/UL (ref 3.9–12.7)

## 2024-12-16 PROCEDURE — 97530 THERAPEUTIC ACTIVITIES: CPT

## 2024-12-16 PROCEDURE — 83735 ASSAY OF MAGNESIUM: CPT

## 2024-12-16 PROCEDURE — 85025 COMPLETE CBC W/AUTO DIFF WBC: CPT

## 2024-12-16 PROCEDURE — 25000003 PHARM REV CODE 250

## 2024-12-16 PROCEDURE — 25000003 PHARM REV CODE 250: Performed by: STUDENT IN AN ORGANIZED HEALTH CARE EDUCATION/TRAINING PROGRAM

## 2024-12-16 PROCEDURE — 84100 ASSAY OF PHOSPHORUS: CPT

## 2024-12-16 PROCEDURE — 80053 COMPREHEN METABOLIC PANEL: CPT

## 2024-12-16 RX ORDER — ENOXAPARIN SODIUM 100 MG/ML
40 INJECTION SUBCUTANEOUS EVERY 24 HOURS
Qty: 11.2 ML | Refills: 0 | Status: SHIPPED | OUTPATIENT
Start: 2024-12-12 | End: 2024-12-16 | Stop reason: HOSPADM

## 2024-12-16 RX ORDER — POLYETHYLENE GLYCOL 3350 17 G/17G
17 POWDER, FOR SOLUTION ORAL 2 TIMES DAILY
Status: DISCONTINUED | OUTPATIENT
Start: 2024-12-16 | End: 2024-12-16 | Stop reason: HOSPADM

## 2024-12-16 RX ORDER — OXYCODONE HYDROCHLORIDE 5 MG/1
5 TABLET ORAL EVERY 4 HOURS PRN
Qty: 20 TABLET | Refills: 0 | Status: SHIPPED | OUTPATIENT
Start: 2024-12-16

## 2024-12-16 RX ORDER — ACETAMINOPHEN 500 MG
1000 TABLET ORAL EVERY 8 HOURS
COMMUNITY
Start: 2024-12-16

## 2024-12-16 RX ORDER — POLYETHYLENE GLYCOL 3350 17 G/17G
17 POWDER, FOR SOLUTION ORAL 2 TIMES DAILY
COMMUNITY
Start: 2024-12-16

## 2024-12-16 RX ORDER — ENOXAPARIN SODIUM 100 MG/ML
40 INJECTION SUBCUTANEOUS EVERY 24 HOURS
Status: DISCONTINUED | OUTPATIENT
Start: 2024-12-16 | End: 2024-12-16 | Stop reason: HOSPADM

## 2024-12-16 RX ADMIN — ACETAMINOPHEN 1000 MG: 500 TABLET ORAL at 02:12

## 2024-12-16 RX ADMIN — METHOCARBAMOL 500 MG: 500 TABLET ORAL at 10:12

## 2024-12-16 RX ADMIN — POLYETHYLENE GLYCOL 3350 17 G: 17 POWDER, FOR SOLUTION ORAL at 10:12

## 2024-12-16 RX ADMIN — MUPIROCIN: 20 OINTMENT TOPICAL at 10:12

## 2024-12-16 RX ADMIN — DOCUSATE SODIUM 50 MG: 50 CAPSULE, LIQUID FILLED ORAL at 10:12

## 2024-12-16 NOTE — PT/OT/SLP PROGRESS
"Physical Therapy Treatment    Patient Name:  Oumou Leon   MRN:  [id number]    Recommendations:     Discharge Recommendations: No Therapy Indicated  Discharge Equipment Recommendations: none  Barriers to discharge: None    Assessment:     Oumou Leon is a 54 y.o. female admitted with a medical diagnosis of <principal problem not specified>.  She presents with the following impairments/functional limitations: impaired endurance, pain.    Rehab Prognosis: Good; patient would benefit from acute skilled PT services to address these deficits and reach maximum level of function.    Recent Surgery: Procedure(s) (LRB):  PARTIAL LIVER RESECTION- SEGMENT 3 AND 5 LIVER LESION (N/A)  CHOLECYSTECTOMY 4 Days Post-Op    Plan:     During this hospitalization, patient to be seen 4 x/week to address the identified rehab impairments via gait training, therapeutic activities, therapeutic exercises, neuromuscular re-education and progress toward the following goals:    Plan of Care Expires:  01/12/25    Subjective     Chief Complaint: fatigue and abdominal discomfort  Patient/Family Comments/goals: return home  Pain/Comfort:  Pain Rating 1: 4/10  Location 1: abdomen ("pressure")  Pain Addressed 1: Distraction  Pain Rating Post-Intervention 1: 5/10      Objective:     Communicated with RN prior to session.  Patient found HOB elevated with telemetry, peripheral IV upon PT entry to room.     General Precautions: Standard, fall  Orthopedic Precautions: N/A  Braces: N/A  Respiratory Status: Room air     Functional Mobility:  Bed Mobility:   log roll technique  Rolling Left:  supervision  Rolling Right: supervision  Scooting: supervision  Supine to Sit: supervision  Sit to Supine: supervision  Transfers:     Sit to Stand:  stand by assistance with no AD  Gait: 424 ft, SBA, no AD; forward flexed posture, Vcing for upright posture    AM-PAC 6 CLICK MOBILITY  Turning over in bed (including adjusting bedclothes, sheets and blankets)?: " 4  Sitting down on and standing up from a chair with arms (e.g., wheelchair, bedside commode, etc.): 3  Moving from lying on back to sitting on the side of the bed?: 3  Moving to and from a bed to a chair (including a wheelchair)?: 3  Need to walk in hospital room?: 3  Climbing 3-5 steps with a railing?: 3  Basic Mobility Total Score: 19       Treatment & Education:  Pt educated on gradually laying the bed flat to get adjusted to normal trunk extension/neutral instead of being flexed.  Patient educated on role of therapy, goals of session, and benefits of mobilizing.   Discussed PT plan of care during hospitalization.   Patient educated on calling for assistance.   Patient educated on how their diagnosis impacts their mobility within PT scope of practice.   Communication board up to date.  All questions answered within PT scope of practice.      Patient left semi-supine with all lines intact and call button in reach..    GOALS:   Multidisciplinary Problems       Physical Therapy Goals          Problem: Physical Therapy    Goal Priority Disciplines Outcome Interventions   Physical Therapy Goal     PT, PT/OT Progressing    Description: Goals to be met by: 2024     Patient will increase functional independence with mobility by performin. Sit to stand transfer with Modified [place]  2. Bed to chair transfer with Modified [place] using LRAD  3. Gait  x 250 feet with Supervision using RW or LRAD.   4. Stand for 5 minutes with Supervision using LRAD while performing dynamic balance task  5. Lower extremity exercise program x30 reps per handout, with independence                         Time Tracking:     PT Received On: 24  PT Start Time: 1125     PT Stop Time: 1143  PT Total Time (min): 18 min     Billable Minutes: Therapeutic Activity 18    Treatment Type: Treatment  PT/PTA: PT     Number of PTA visits since last PT visit: 0     2024

## 2024-12-16 NOTE — PROGRESS NOTES
[facility] - [facility]  General Surgery  Progress Note    Subjective:     History of Present Illness:  No notes on file    Post-Op Info:  Procedure(s) (LRB):  PARTIAL LIVER RESECTION- SEGMENT 3 AND 5 LIVER LESION (N/A)  CHOLECYSTECTOMY   4 Days Post-Op     Interval History: NAEO. Not eating a ton. US yesterday unremarkable. Liver studies down trending yesterday. Awaiting labs this am.     Medications:  Continuous Infusions:  Scheduled Meds:   acetaminophen  1,000 mg Oral Q8H    enoxparin  40 mg Subcutaneous Q24H (prophylaxis, 1700)    ketorolac  10 mg Oral Q6H    methocarbamoL  500 mg Oral QID    mupirocin   Nasal BID    polyethylene glycol  17 g Oral BID     PRN Meds:  Current Facility-Administered Medications:     0.9%  NaCl infusion (for blood administration), , Intravenous, Q24H PRN    dextrose 10%, 12.5 g, Intravenous, PRN    dextrose 10%, 25 g, Intravenous, PRN    ondansetron, 4 mg, Intravenous, Q6H PRN    oxyCODONE, 5 mg, Oral, Q4H PRN    oxyCODONE, 10 mg, Oral, Q4H PRN     Review of patient's allergies indicates:  No Known Allergies  Objective:     Vital Signs (Most Recent):  Temp: 98.1 °F (36.7 °C) (12/16/24 0420)  Pulse: 81 (12/16/24 0420)  Resp: 17 (12/16/24 0420)  BP: 139/86 (12/16/24 0420)  SpO2: 97 % (12/16/24 0420) Vital Signs (24h Range):  Temp:  [97.9 °F (36.6 °C)-98.5 °F (36.9 °C)] 98.1 °F (36.7 °C)  Pulse:  [69-90] 81  Resp:  [15-19] 17  SpO2:  [93 %-99 %] 97 %  BP: (127-150)/(78-90) 139/86     Weight: 71.2 kg (157 lb)  Body mass index is 29.66 kg/m².    Intake/Output - Last 3 Shifts         12/14 0700  12/15 0659 12/15 0700  12/16 0659    P.O. 240     Total Intake(mL/kg) 240 (3.4)     Urine (mL/kg/hr) 2375 (1.4) 600 (0.4)    Stool 0 0    Total Output 2375 600    Net -2135 -600          Stool Occurrence 0 x 0 x             Physical Exam  Vitals reviewed.   Constitutional:       General: She is not in acute distress.     Appearance: Normal appearance.   HENT:      Head: Normocephalic.   Eyes:       Extraocular Movements: Extraocular movements intact.   Cardiovascular:      Rate and Rhythm: Normal rate.      Pulses: Normal pulses.   Pulmonary:      Effort: Pulmonary effort is normal. No respiratory distress.      Breath sounds: No wheezing.   Abdominal:      Palpations: Abdomen is soft.      Tenderness: There is no guarding or rebound.      Comments: Appropriately TTP. Midline incision c/d/I with overlying dermabond.    Musculoskeletal:         General: Normal range of motion.      Cervical back: Normal range of motion.   Skin:     General: Skin is warm.      Capillary Refill: Capillary refill takes less than 2 seconds.   Neurological:      General: No focal deficit present.      Mental Status: She is alert and oriented to person, place, and time.   Psychiatric:         Mood and Affect: Mood normal.          Significant Labs:  I have reviewed all pertinent lab results within the past 24 hours.    Significant Diagnostics:  I have reviewed all pertinent imaging results/findings within the past 24 hours.  Assessment/Plan:     Adenocarcinoma of colon  Oumou Leon is a 54 y.o. F w/ PMHx including colorectal liver metastasis . Now s/p segment 3 and 5 liver resection with cholecystectomy with Dr. Rosado on 12/13/24    - Will see what CMP is this am. US normal 12/15 completed for elevated liver studies.   - Diet: regular diet. Hopefully eat more today.   - Pain: PO multimodals  - Antibiotics: N/A  - Home Meds: ordered as appropriate   - DVT Prophylaxis: lovenox  - Labs: Daily CBC, CMP, Mg, Ph - replete lytes prn  - PRN nausea control  - Endo: sliding scale insulin  - Aggressive pulmonary toilet: IS, acapella, Xopenex nebs  - OOB/ambulate, PT/OT    Dispo: Continue inpatient care; possible d/c PM or tomorrow pending labs and diet tolerance             Marc Kaur MD  General Surgery  [facility]

## 2024-12-16 NOTE — DISCHARGE SUMMARY
HOSPITAL DISCHARGE SUMMARY      I.   IDENTIFYING DATA         A.  Name:Oumou Leon              Sex:female              MRN:[id number]              :1970              Date of admission:2024  5:12 AM              Date of discharge: No discharge date for patient encounter.       II. SUCCINCT SUMMARY OF ADMISSION STATUS AND HOSPITAL COURSE    For details of hospital stay, please refer to daily progress notes. Briefly, this is a 54 y.o. female presented on 24 for planned surgical intervention for adenocarcinoma of the colon metastasized to the liver. Patient was taken to OR and underwent segment 3 and 5 hepatectomy. Post-operatively patient was admitted to the floor. Post-operative course complicated by elevation in LFTs; work up negative and LFTs down trending upon discharge .    On the day of discharge, the patient was ambulating without difficulty, voiding spontaneously, was tolerating a diet without nausea or vomiting, and pain was well controlled on PO pain medications. Discharge instructions were explained to the patient and appropriate follow-up was arranged.      III. PATIENT'S MEDICAL CONDITION AT DISCHARGE       good    IV. SUMMARY OF PROCEDURE(S) PERFORMED          Procedure(s) (LRB):  PARTIAL LIVER RESECTION- SEGMENT 3 AND 5 LIVER LESION (N/A)  CHOLECYSTECTOMY      V.  DISCHARGE DIAGNOSES        Adenocarcinoma of colon       VI. RESULTS PENDING AT TIME OF DISCHARGE         Pathology    VII. MEDICATIONS TAKEN PRIOR TO ADMISSION    Current Outpatient Medications   Medication Instructions    acetaminophen (TYLENOL) 1,000 mg, Oral, Every 8 hours    apixaban (ELIQUIS) 2.5 mg, Oral, 2 times daily    lisinopriL 10 mg, Every morning    oxyCODONE (ROXICODONE) 5 mg, Oral, Every 4 hours PRN    polyethylene glycol (GLYCOLAX) 17 g, Oral, 2 times daily        VIII. MEDICATIONS TO BE TAKEN FOLLOWING DISCHARGE       Medication List        START taking these medications      acetaminophen 500 MG  tablet  Commonly known as: TYLENOL  Take 2 tablets (1,000 mg total) by mouth every 8 (eight) hours.     apixaban 2.5 mg Tab  Commonly known as: ELIQUIS  Take 1 tablet (2.5 mg total) by mouth 2 (two) times daily. for 24 days     oxyCODONE 5 MG immediate release tablet  Commonly known as: ROXICODONE  Take 1 tablet (5 mg total) by mouth every 4 (four) hours as needed for Pain.     polyethylene glycol 17 gram Pwpk  Commonly known as: GLYCOLAX  Take 17 g by mouth 2 (two) times daily.            CONTINUE taking these medications      lisinopriL 10 MG tablet               Where to Get Your Medications        These medications were sent to [facility]  [address] [facility] [address]      Hours: Always Open Phone: [phone]   apixaban 2.5 mg Tab  oxyCODONE 5 MG immediate release tablet       You can get these medications from any pharmacy    You don't need a prescription for these medications  acetaminophen 500 MG tablet  polyethylene glycol 17 gram Pwpk          IX.  DISCHARGE ORDERS AND INSTRUCTIONS    Discharge Procedure Orders   Diet Adult Regular     Lifting restrictions   Order Comments: No lifting greater than 10 pounds for 6 weeks from day of surgery.  No pushing/pulling such as vacuuming or raking.  No straining, avoid constipation and take stool softeners as described and laxatives as needed.  No driving while on narcotics and until you can react quickly without pain.     No dressing needed   Order Comments: WOUND CARE  You have skin glue over your incision(s)  This will slowly flake away in about 10 days  It is okay to shower starting the day after surgery  Can pat your incision dry  Please do not scrub hard over your incisions  Please do not pick the glue off or it will reopen the wound     Notify your health care provider if you experience any of the following:  temperature >100.4     Notify your health care provider if you experience any of the following:  persistent nausea and  vomiting or diarrhea     Notify your health care provider if you experience any of the following:  severe uncontrolled pain     Notify your health care provider if you experience any of the following:  redness, tenderness, or signs of infection (pain, swelling, redness, odor or green/yellow discharge around incision site)     Notify your health care provider if you experience any of the following:  difficulty breathing or increased cough     Notify your health care provider if you experience any of the following:  severe persistent headache     Notify your health care provider if you experience any of the following:  worsening rash     Notify your health care provider if you experience any of the following:  persistent dizziness, light-headedness, or visual disturbances     Notify your health care provider if you experience any of the following:  increased confusion or weakness     Activity as tolerated   Order Comments: You had a liver resection performed.     Please take tylenol for pain as directed by the bottle. You have also been prescribed a narcotic pain medication to help with post-operative pain.     Please make sure to take 1 capful of Miralax per day to help with narcotic associated constipation.     Please do not drive while on narcotic pain medication.    You have skin glue (dermabond) over your incision. Skin glue will fall off on its own  You may shower and let soapy water run over your incision, do not scrub. Please no baths or soaking for 2 weeks. Please do not remove until you are seen in clinic.    [place] no heavy lifting/pushing/pulling. Do not lift anything heavier than a gallon of milk (about 10-15 lbs) for the next 4-6 weeks.    You have no diet restrictions.    Please follow up in clinic with Dr. Rosado in 1 week.       X. DISCHARGE DISPOSITION          Home or Self Care      Mitzy Frost MD  General Surgery PGY-1  12/16/2024

## 2024-12-16 NOTE — DISCHARGE INSTRUCTIONS
You had a liver resection performed.     Please take tylenol for pain as directed by the bottle. You have also been prescribed a narcotic pain medication to help with post-operative pain.     Please make sure to take 1 capful of Miralax per day to help with narcotic associated constipation.     Please do not drive while on narcotic pain medication.    You have skin glue (dermabond) over your incision. Skin glue will fall off on its own  You may shower and let soapy water run over your incision, do not scrub. Please no baths or soaking for 2 weeks. Please do not remove until you are seen in clinic.    [place] no heavy lifting/pushing/pulling. Do not lift anything heavier than a gallon of milk (about 10-15 lbs) for the next 4-6 weeks.    You have no diet restrictions.    Please follow up in clinic with Dr. Rosado in 1 week.

## 2024-12-16 NOTE — PT/OT/SLP PROGRESS
"Occupational Therapy   Treatment    Name: Oumou Leon  MRN: [id number]  Admitting Diagnosis:  Adenocarcinoma of colon  4 Days Post-Op    Recommendations:     Discharge Recommendations: No Therapy Indicated  Discharge Equipment Recommendations:  none  Barriers to discharge:  None    Assessment:     Oumou Leon is a 54 y.o. female with a medical diagnosis of Adenocarcinoma of colon.  Pt tolerated session well and without incident and is progressing toward her OT goals.  She presents with the following. Performance deficits affecting function are pain, impaired endurance.     Rehab Prognosis:  Good; patient would benefit from acute skilled OT services to address these deficits and reach maximum level of function.       Plan:     Patient to be seen 4 x/week to address the above listed problems via self-care/home management, therapeutic activities, therapeutic exercises  Plan of Care Expires: 01/11/25  Plan of Care Reviewed with: patient    Subjective   "The nurse is about to come cover my IV so I can shower.  I'm leaving today."  Chief Complaint: abdominal pain  Patient/Family Comments/goals: "It really hurts when I stand straight up."  Pain/Comfort:  Pain Rating 1: other (see comments) (not rated but increased when standing erect)  Location - Orientation 1: generalized  Location 1: abdomen  Pain Addressed 1: Distraction  Pain Rating Post-Intervention 1: other (see comments) (not rated)    Objective:     Communicated with: nursing prior to session.  Patient found HOB elevated with peripheral IV (IV not connected and running) upon OT entry to room.    General Precautions: Standard, fall    Orthopedic Precautions:N/A  Braces: N/A  Respiratory Status: Room air     Occupational Performance:     Bed Mobility:    Patient completed Rolling/Turning to Left with  supervision  Patient completed Scooting/Bridging with supervision  Patient completed Supine to Sit with supervision     Functional Mobility/Transfers:  Patient " completed Sit <> Stand Transfer from EOB x 1 trial with stand by assistance  with  no assistive device   Functional Mobility: Pt ambulated ~240 ft with SBA with no AD.  She required 2-3 standing rest breaks.  Pt reported no dizziness and was steady.    Activities of Daily Living:  Pt said she had already performed ADLs and was awaiting nursing to cover for IV so she could shower.      [facility] 6 Click ADL: 20    Treatment & Education:  Pt edu on role of OT, POC, safety when performing self care tasks, benefit of performing OOB activity, and safety when performing functional transfers and mobility.    - Self care tasks completed-- as noted above      Patient left sitting edge of bed with all lines intact and call button in reach    GOALS:   Multidisciplinary Problems       Occupational Therapy Goals          Problem: Occupational Therapy    Goal Priority Disciplines Outcome Interventions   Occupational Therapy Goal     OT, PT/OT Progressing    Description: Goals to be met by: 12/27/2024     Patient will increase functional independence with ADLs by performing:    UE Dressing with Set-up Assistance.  LE Dressing with Supervision.  Grooming while standing at sink with Supervision.  Toileting from toilet with Supervision for hygiene and clothing management.   Supine to sit with Stand-by Assistance. - Met 12/16  Step transfer with Supervision.  Toilet transfer to toilet with Supervision.                         Time Tracking:     OT Date of Treatment: 12/16/24  OT Start Time: 1544  OT Stop Time: 1552  OT Total Time (min): 8 min    Billable Minutes:Therapeutic Activity 8 min    OT/MOLLY: OT          12/16/2024

## 2024-12-16 NOTE — SUBJECTIVE & OBJECTIVE
Interval History: NAEO. Not eating a ton. US yesterday unremarkable. Liver studies down trending yesterday. Awaiting labs this am.     Medications:  Continuous Infusions:  Scheduled Meds:   acetaminophen  1,000 mg Oral Q8H    enoxparin  40 mg Subcutaneous Q24H (prophylaxis, 1700)    ketorolac  10 mg Oral Q6H    methocarbamoL  500 mg Oral QID    mupirocin   Nasal BID    polyethylene glycol  17 g Oral BID     PRN Meds:  Current Facility-Administered Medications:     0.9%  NaCl infusion (for blood administration), , Intravenous, Q24H PRN    dextrose 10%, 12.5 g, Intravenous, PRN    dextrose 10%, 25 g, Intravenous, PRN    ondansetron, 4 mg, Intravenous, Q6H PRN    oxyCODONE, 5 mg, Oral, Q4H PRN    oxyCODONE, 10 mg, Oral, Q4H PRN     Review of patient's allergies indicates:  No Known Allergies  Objective:     Vital Signs (Most Recent):  Temp: 98.1 °F (36.7 °C) (12/16/24 0420)  Pulse: 81 (12/16/24 0420)  Resp: 17 (12/16/24 0420)  BP: 139/86 (12/16/24 0420)  SpO2: 97 % (12/16/24 0420) Vital Signs (24h Range):  Temp:  [97.9 °F (36.6 °C)-98.5 °F (36.9 °C)] 98.1 °F (36.7 °C)  Pulse:  [69-90] 81  Resp:  [15-19] 17  SpO2:  [93 %-99 %] 97 %  BP: (127-150)/(78-90) 139/86     Weight: 71.2 kg (157 lb)  Body mass index is 29.66 kg/m².    Intake/Output - Last 3 Shifts         12/14 0700  12/15 0659 12/15 0700  12/16 0659    P.O. 240     Total Intake(mL/kg) 240 (3.4)     Urine (mL/kg/hr) 2375 (1.4) 600 (0.4)    Stool 0 0    Total Output 2375 600    Net -2135 -600          Stool Occurrence 0 x 0 x             Physical Exam  Vitals reviewed.   Constitutional:       General: She is not in acute distress.     Appearance: Normal appearance.   HENT:      Head: Normocephalic.   Eyes:      Extraocular Movements: Extraocular movements intact.   Cardiovascular:      Rate and Rhythm: Normal rate.      Pulses: Normal pulses.   Pulmonary:      Effort: Pulmonary effort is normal. No respiratory distress.      Breath sounds: No wheezing.    Abdominal:      Palpations: Abdomen is soft.      Tenderness: There is no guarding or rebound.      Comments: Appropriately TTP. Midline incision c/d/I with overlying dermabond.    Musculoskeletal:         General: Normal range of motion.      Cervical back: Normal range of motion.   Skin:     General: Skin is warm.      Capillary Refill: Capillary refill takes less than 2 seconds.   Neurological:      General: No focal deficit present.      Mental Status: She is alert and oriented to person, place, and time.   Psychiatric:         Mood and Affect: Mood normal.          Significant Labs:  I have reviewed all pertinent lab results within the past 24 hours.    Significant Diagnostics:  I have reviewed all pertinent imaging results/findings within the past 24 hours.

## 2024-12-17 LAB
FINAL PATHOLOGIC DIAGNOSIS: NORMAL
GROSS: NORMAL
Lab: NORMAL
MICROSCOPIC EXAM: NORMAL

## 2024-12-17 NOTE — PLAN OF CARE
[facility] - [facility]  Discharge Final Note    Primary Care Provider: No primary care provider on file.  Expected Discharge Date: 12/16/2024    Patient medically ready for discharge to home.     Transportation by family.     Is family/patient aware of discharge: yes     Hospital follow up scheduled: yes       Final Discharge Note (most recent)       Final Note - 12/17/24 0947          Final Note    Assessment Type Final Discharge Note     Anticipated Discharge Disposition Home or Self Care     Hospital Resources/Appts/Education Provided Provided patient/caregiver with written discharge plan information        Post-Acute Status    Patient choice form signed by patient/caregiver List with quality metrics by geographic area provided                     Important Message from Medicare         Referral Info (most recent)       Referral Info    No documentation.                 Contact Info       Ponce Rosado Jr., MD   Specialty: Surgery    [address]  5th [address]  [place] [address]   Phone: [phone]       Next Steps: Follow up in 1 week(s)          Future Appointments   Date Time Provider Department Center   12/23/2024 12:40 PM LAB, [facility] LAB  Roberto Boss   12/23/2024  1:30 PM Lashae Silva, [facility] SURON[facility] Roberto De Anda RN  Case Management  Ext: [phone]  12/17/2024

## 2024-12-23 ENCOUNTER — LAB VISIT (OUTPATIENT)
Dept: LAB | Facility: HOSPITAL | Age: 54
End: 2024-12-23
Attending: STUDENT IN AN ORGANIZED HEALTH CARE EDUCATION/TRAINING PROGRAM
Payer: COMMERCIAL

## 2024-12-23 ENCOUNTER — OFFICE VISIT (OUTPATIENT)
Dept: SURGERY | Facility: CLINIC | Age: 54
End: 2024-12-23
Payer: COMMERCIAL

## 2024-12-23 VITALS
HEART RATE: 75 BPM | SYSTOLIC BLOOD PRESSURE: 127 MMHG | WEIGHT: 155.75 LBS | DIASTOLIC BLOOD PRESSURE: 73 MMHG | OXYGEN SATURATION: 98 % | BODY MASS INDEX: 29.43 KG/M2

## 2024-12-23 DIAGNOSIS — C18.9 ADENOCARCINOMA OF COLON METASTATIC TO LIVER: Primary | ICD-10-CM

## 2024-12-23 DIAGNOSIS — C18.9 ADENOCARCINOMA OF COLON: ICD-10-CM

## 2024-12-23 DIAGNOSIS — C78.7 ADENOCARCINOMA OF COLON METASTATIC TO LIVER: Primary | ICD-10-CM

## 2024-12-23 LAB
ALBUMIN SERPL BCP-MCNC: 3.6 G/DL (ref 3.5–5.2)
ALP SERPL-CCNC: 407 U/L (ref 40–150)
ALT SERPL W/O P-5'-P-CCNC: 47 U/L (ref 10–44)
ANION GAP SERPL CALC-SCNC: 12 MMOL/L (ref 8–16)
AST SERPL-CCNC: 23 U/L (ref 10–40)
BASOPHILS # BLD AUTO: 0.06 K/UL (ref 0–0.2)
BASOPHILS NFR BLD: 0.8 % (ref 0–1.9)
BILIRUB SERPL-MCNC: 0.4 MG/DL (ref 0.1–1)
BUN SERPL-MCNC: 7 MG/DL (ref 6–20)
CALCIUM SERPL-MCNC: 9.8 MG/DL (ref 8.7–10.5)
CHLORIDE SERPL-SCNC: 102 MMOL/L (ref 95–110)
CO2 SERPL-SCNC: 26 MMOL/L (ref 23–29)
CREAT SERPL-MCNC: 0.7 MG/DL (ref 0.5–1.4)
DIFFERENTIAL METHOD BLD: ABNORMAL
EOSINOPHIL # BLD AUTO: 0.3 K/UL (ref 0–0.5)
EOSINOPHIL NFR BLD: 3.6 % (ref 0–8)
ERYTHROCYTE [DISTWIDTH] IN BLOOD BY AUTOMATED COUNT: 12.3 % (ref 11.5–14.5)
EST. GFR  (NO RACE VARIABLE): >60 ML/MIN/1.73 M^2
GLUCOSE SERPL-MCNC: 84 MG/DL (ref 70–110)
HCT VFR BLD AUTO: 36.8 % (ref 37–48.5)
HGB BLD-MCNC: 11.8 G/DL (ref 12–16)
IMM GRANULOCYTES # BLD AUTO: 0.02 K/UL (ref 0–0.04)
IMM GRANULOCYTES NFR BLD AUTO: 0.3 % (ref 0–0.5)
LYMPHOCYTES # BLD AUTO: 1.5 K/UL (ref 1–4.8)
LYMPHOCYTES NFR BLD: 20.9 % (ref 18–48)
MCH RBC QN AUTO: 29.9 PG (ref 27–31)
MCHC RBC AUTO-ENTMCNC: 32.1 G/DL (ref 32–36)
MCV RBC AUTO: 93 FL (ref 82–98)
MONOCYTES # BLD AUTO: 0.6 K/UL (ref 0.3–1)
MONOCYTES NFR BLD: 8.5 % (ref 4–15)
NEUTROPHILS # BLD AUTO: 4.7 K/UL (ref 1.8–7.7)
NEUTROPHILS NFR BLD: 65.9 % (ref 38–73)
NRBC BLD-RTO: 0 /100 WBC
PLATELET # BLD AUTO: 394 K/UL (ref 150–450)
PMV BLD AUTO: 9.4 FL (ref 9.2–12.9)
POTASSIUM SERPL-SCNC: 4.2 MMOL/L (ref 3.5–5.1)
PROT SERPL-MCNC: 7.7 G/DL (ref 6–8.4)
RBC # BLD AUTO: 3.95 M/UL (ref 4–5.4)
SODIUM SERPL-SCNC: 140 MMOL/L (ref 136–145)
WBC # BLD AUTO: 7.14 K/UL (ref 3.9–12.7)

## 2024-12-23 PROCEDURE — 99999 PR PBB SHADOW E&M-EST. PATIENT-LVL III: CPT | Mod: PBBFAC,,,

## 2024-12-23 PROCEDURE — 99024 POSTOP FOLLOW-UP VISIT: CPT | Mod: S$GLB,,,

## 2024-12-23 PROCEDURE — 85025 COMPLETE CBC W/AUTO DIFF WBC: CPT | Performed by: STUDENT IN AN ORGANIZED HEALTH CARE EDUCATION/TRAINING PROGRAM

## 2024-12-23 PROCEDURE — 3074F SYST BP LT 130 MM HG: CPT | Mod: CPTII,S$GLB,,

## 2024-12-23 PROCEDURE — 36415 COLL VENOUS BLD VENIPUNCTURE: CPT | Performed by: STUDENT IN AN ORGANIZED HEALTH CARE EDUCATION/TRAINING PROGRAM

## 2024-12-23 PROCEDURE — 3078F DIAST BP <80 MM HG: CPT | Mod: CPTII,S$GLB,,

## 2024-12-23 PROCEDURE — 80053 COMPREHEN METABOLIC PANEL: CPT | Performed by: STUDENT IN AN ORGANIZED HEALTH CARE EDUCATION/TRAINING PROGRAM

## 2024-12-23 RX ORDER — DIPHENHYDRAMINE HCL 50 MG
50 CAPSULE ORAL EVERY 6 HOURS PRN
Qty: 20 CAPSULE | Refills: 0 | Status: SHIPPED | OUTPATIENT
Start: 2024-12-23 | End: 2024-12-28

## 2024-12-23 RX ORDER — SULFAMETHOXAZOLE AND TRIMETHOPRIM 800; 160 MG/1; MG/1
1 TABLET ORAL 2 TIMES DAILY
Qty: 14 TABLET | Refills: 0 | Status: SHIPPED | OUTPATIENT
Start: 2024-12-23 | End: 2024-12-30

## 2024-12-23 RX ORDER — METHYLPREDNISOLONE 4 MG/1
TABLET ORAL
Qty: 21 EACH | Refills: 0 | Status: SHIPPED | OUTPATIENT
Start: 2024-12-23 | End: 2025-01-13

## 2025-01-06 ENCOUNTER — OFFICE VISIT (OUTPATIENT)
Dept: SURGERY | Facility: CLINIC | Age: 55
End: 2025-01-06
Payer: COMMERCIAL

## 2025-01-06 VITALS
OXYGEN SATURATION: 99 % | BODY MASS INDEX: 29.89 KG/M2 | DIASTOLIC BLOOD PRESSURE: 83 MMHG | SYSTOLIC BLOOD PRESSURE: 145 MMHG | HEART RATE: 63 BPM | WEIGHT: 158.19 LBS

## 2025-01-06 DIAGNOSIS — C78.7 ADENOCARCINOMA OF COLON METASTATIC TO LIVER: Primary | ICD-10-CM

## 2025-01-06 DIAGNOSIS — C18.9 ADENOCARCINOMA OF COLON METASTATIC TO LIVER: Primary | ICD-10-CM

## 2025-01-06 PROCEDURE — 99999 PR PBB SHADOW E&M-EST. PATIENT-LVL III: CPT | Mod: PBBFAC,,,

## 2025-01-06 PROCEDURE — 99024 POSTOP FOLLOW-UP VISIT: CPT | Mod: S$GLB,,,

## 2025-01-06 PROCEDURE — 1159F MED LIST DOCD IN RCRD: CPT | Mod: CPTII,S$GLB,,

## 2025-01-06 PROCEDURE — 3079F DIAST BP 80-89 MM HG: CPT | Mod: CPTII,S$GLB,,

## 2025-01-06 PROCEDURE — 3077F SYST BP >= 140 MM HG: CPT | Mod: CPTII,S$GLB,,

## 2025-01-10 NOTE — PROGRESS NOTES
Post-Op Follow-up Visit:   12/23/2024  Patient ID: Oumou Leon is a 54 y.o. female, born 1970    No chief complaint on file.    Interval History: Ms. Leon returns to the clinic after a partial liver resection with Dr. Rosado on 12/12/24. She is doing well. Has little pain. She is tolerating a regular diet and is having bowel function. Noted a rash around her incision that spread across her abd and itches a few days after surgery. Denies fever, chills, N/v/D, Sob, chest pain.     Physical Exam:  /73 (BP Location: Left arm, Patient Position: Sitting)   Pulse 75   Wt 70.7 kg (155 lb 12.1 oz)   SpO2 98%   BMI 29.43 kg/m²     General:  Non-toxic, ambulatory  Abd:  Soft, non-tender  Incision:  midline incision with red raised rash around the incision concentrated heaviest around the dermabond. There is no sign of infection or drainage noted.     Pathology:  Final Pathologic Diagnosis   1. Liver, segment 3 lesion (resection):  - Positive for malignancy  - Adenocarcinoma with extensive necrosis and treatment associated change  - Examined margin is negative (less than 1 mm to ink)    2. Gallbladder (cholecystectomy):    -Benign gallbladder     3.  5, segment 5 liver lesion (resection):  - Positive for malignancy  - Metastatic adenocarcinoma with extensive necrosis and treatment associated changes  - Margin appears negative  - Immunostains in microscopic section      ICD-10-CM ICD-9-CM    1. Adenocarcinoma of colon metastatic to liver  C18.9 153.9 diphenhydrAMINE (BENADRYL) 50 MG capsule    C78.7 197.7 sulfamethoxazole-trimethoprim 800-160mg (BACTRIM DS) 800-160 mg Tab      methylPREDNISolone (MEDROL DOSEPACK) 4 mg tablet      2. Adenocarcinoma of colon  C18.9 153.9       Plan    Ms. Leon returns to the clinic after a partial liver resection with Dr. Rosado on 12/12/24. She is doing well. Has little pain. She is tolerating a regular diet and is having bowel function. Noted a rash around her incision  that spread across her abd and itches a few days after surgery. On exam her midline incision with red raised rash around the incision concentrated heaviest around the dermabond. There is no sign of infection or drainage noted.   Bactrim and steroid dose pack for rash.   Pathology reviewed- has f/u with oncology- Dr. Vargas next week.   Reviewed lifting restrictions.   Questions were asked and answered to patient's satisfaction.  We discussed the need for continued clinical/radiographic/endoscopic follow-up.     Follow up in about 2 weeks (around 1/6/2025), or if symptoms worsen or fail to improve.    Seen in conjunction with Dr. Rosado.          BULL Warner, FNP-C  Upper GI / Hepatobiliary Surgical Oncology  [facility] [place], [place]  Office: [phone]  Fax: [phone]

## 2025-02-07 ENCOUNTER — PATIENT MESSAGE (OUTPATIENT)
Dept: ADMINISTRATIVE | Facility: OTHER | Age: 55
End: 2025-02-07
Payer: COMMERCIAL

## 2025-03-06 NOTE — PROGRESS NOTES
Post-Op Follow-up Visit:   1/6/2025  Patient ID: Oumou Leon is a 54 y.o. female, born 1970    No chief complaint on file.    Interval History: Ms. Leon returns to the clinic after a partial liver resection with Dr. Rosado on 12/12/24. She is doing well. Has no pain. She is tolerating a regular diet and is having bowel function. Rash has improved around her incision. Denies fever, chills, N/v/D, Sob, chest pain.     Physical Exam:  BP (!) 145/83 (BP Location: Left arm, Patient Position: Sitting)   Pulse 63   Wt 71.7 kg (158 lb 2.9 oz)   SpO2 99%   BMI 29.89 kg/m²     General:  Non-toxic, ambulatory  Abd:  Soft, non-tender  Incision:  midline incision CDI healing well. Rash resolved around incision.     Pathology:  Final Pathologic Diagnosis       1. Liver, segment 3 lesion (resection):  - Positive for malignancy  - Adenocarcinoma with extensive necrosis and treatment associated change  - Examined margin is negative (less than 1 mm to ink)     2. Gallbladder (cholecystectomy):    -Benign gallbladder      3.  5, segment 5 liver lesion (resection):  - Positive for malignancy  - Metastatic adenocarcinoma with extensive necrosis and treatment associated changes  - Margin appears negative  - Immunostains in microscopic section      ICD-10-CM ICD-9-CM    1. Adenocarcinoma of colon metastatic to liver  C18.9 153.9     C78.7 197.7       Plan   Ms. Leon returns to the clinic after a partial liver resection with Dr. Rosado on 12/12/24. She is doing well. Has no pain. She is tolerating a regular diet and is having bowel function. Rash has improved around her incision.   Rash has resolved.   Pathology reviewed- has seen Dr. Vargas since our last visit.   Reviewed lifting restrictions.   Questions were asked and answered to patient's satisfaction.  We discussed the need for continued clinical/radiographic/endoscopic follow-up.     Follow up if symptoms worsen or fail to improve.         BULL Warner,  Colonsocopy 2019 with Dr. Long Mammogram was done in February 2025 at [facility]  FNP-C  Upper GI / Hepatobiliary Surgical Oncology  [facility] [place], [place]  Office: [phone]  Fax: [phone]

## (undated) DEVICE — PENCIL ROCKER SWITCH 10FT CORD

## (undated) DEVICE — TIP CUSA CRV EXT STD 1.98MM

## (undated) DEVICE — TOWEL OR XRAY WHITE 17X26IN

## (undated) DEVICE — SEALER AQUAMANTYS 2.3 BIPOLAR

## (undated) DEVICE — NDL HYPO STD REG BVL 22GX1.5IN

## (undated) DEVICE — SUT SILK 2-0 STRANDS 30IN

## (undated) DEVICE — HEMOSTAT SURGICEL NU-KNIT 6X9

## (undated) DEVICE — ELECTRODE EXTENDED BLADE

## (undated) DEVICE — SUT SILK SH 2-0 30IN MP BLK

## (undated) DEVICE — TRAY GENERAL SURGERY OMC

## (undated) DEVICE — SPONGE COTTON TRAY 4X4IN

## (undated) DEVICE — SUT 0 18IN SILK BLK BRAIDE

## (undated) DEVICE — KIT PROBE COVER WITH GEL

## (undated) DEVICE — SUT SILK 2-0 SH 18IN BLACK

## (undated) DEVICE — SET CUSA CLARITY QC CART TBNG

## (undated) DEVICE — APPLICATOR CHLORAPREP ORN 26ML

## (undated) DEVICE — CLIP LIGATION MEDIUM CLIPS 1

## (undated) DEVICE — KIT SAHARA DRAPE DRAW/LIFT

## (undated) DEVICE — SUT PROLENE 4-0 RB-1 BL MO

## (undated) DEVICE — STAPLER SKIN PROXIMATE WIDE

## (undated) DEVICE — GOWN SMART IMP BREATHABLE XXLG

## (undated) DEVICE — CONTAINER SPECIMEN OR STER 4OZ

## (undated) DEVICE — ELECTRODE REM PLYHSV RETURN 9

## (undated) DEVICE — SUT 1 48IN PDS II VIO MONO

## (undated) DEVICE — SPONGE LAP 18X18 PREWASHED

## (undated) DEVICE — PAD K-THERMIA 24IN X 60IN

## (undated) DEVICE — GAUZE SPONGE PEANUT STRL

## (undated) DEVICE — TRAY CATH 1-LYR URIMTR 16FR

## (undated) DEVICE — STAPLER ENDO GIA ULT UNIV 12MM

## (undated) DEVICE — RELOAD SIGNIA SH CRV GRAY 30MM

## (undated) DEVICE — TOWEL OR DISP STRL BLUE 4/PK

## (undated) DEVICE — GOWN SURGICAL X-LARGE

## (undated) DEVICE — SYR 30CC LUER LOCK

## (undated) DEVICE — DRAPE INCISE IOBAN 2 23X17IN

## (undated) DEVICE — SUT PROLENE 3-0 SH DA 36 BL

## (undated) DEVICE — COVER PROXIMA MAYO STAND

## (undated) DEVICE — TUBING SUC UNIV W/CONN 12FT

## (undated) DEVICE — SUT 3-0 12-18IN SILK

## (undated) DEVICE — STAPLE TRI-STAPLE 2.0 CRV TIP

## (undated) DEVICE — SUT SILK 3-0 SH 18IN BLACK

## (undated) DEVICE — SUT 2-0 SILK 30IN BLK BRAID

## (undated) DEVICE — COVER MAYO STND XL 30X57IN

## (undated) DEVICE — SEALER LIGASURE IMPACT 18CM

## (undated) DEVICE — LOOP VESSEL BLUE MAXI

## (undated) DEVICE — DRAPE HALF SURGICAL 40X58IN

## (undated) DEVICE — SUT 2-0 12-18IN SILK

## (undated) DEVICE — DRAPE ABDOMINAL TIBURON 14X11

## (undated) DEVICE — WRENCH CUSA TORQUE SGL USE

## (undated) DEVICE — DRESSING AQUACEL SACRAL 9 X 9

## (undated) DEVICE — BLADE SURG CARBON STEEL SZ11

## (undated) DEVICE — DECANTER FLUID TRNSF WHITE 9IN